# Patient Record
Sex: MALE | HISPANIC OR LATINO | Employment: FULL TIME | URBAN - METROPOLITAN AREA
[De-identification: names, ages, dates, MRNs, and addresses within clinical notes are randomized per-mention and may not be internally consistent; named-entity substitution may affect disease eponyms.]

---

## 2017-10-25 ENCOUNTER — OFFICE VISIT (OUTPATIENT)
Dept: PEDIATRICS | Facility: CLINIC | Age: 37
End: 2017-10-25
Payer: COMMERCIAL

## 2017-10-25 ENCOUNTER — TRANSFERRED RECORDS (OUTPATIENT)
Dept: HEALTH INFORMATION MANAGEMENT | Facility: CLINIC | Age: 37
End: 2017-10-25

## 2017-10-25 VITALS
BODY MASS INDEX: 27.13 KG/M2 | SYSTOLIC BLOOD PRESSURE: 170 MMHG | HEART RATE: 72 BPM | OXYGEN SATURATION: 99 % | TEMPERATURE: 98.9 F | WEIGHT: 183.2 LBS | DIASTOLIC BLOOD PRESSURE: 98 MMHG | HEIGHT: 69 IN

## 2017-10-25 DIAGNOSIS — Z23 NEED FOR PROPHYLACTIC VACCINATION AND INOCULATION AGAINST INFLUENZA: ICD-10-CM

## 2017-10-25 DIAGNOSIS — R93.1 ABNORMAL ECHOCARDIOGRAM: ICD-10-CM

## 2017-10-25 DIAGNOSIS — I10 HYPERTENSION GOAL BP (BLOOD PRESSURE) < 140/90: Primary | ICD-10-CM

## 2017-10-25 PROCEDURE — 90471 IMMUNIZATION ADMIN: CPT | Performed by: FAMILY MEDICINE

## 2017-10-25 PROCEDURE — 99203 OFFICE O/P NEW LOW 30 MIN: CPT | Mod: 25 | Performed by: FAMILY MEDICINE

## 2017-10-25 PROCEDURE — 90686 IIV4 VACC NO PRSV 0.5 ML IM: CPT | Performed by: FAMILY MEDICINE

## 2017-10-25 RX ORDER — AMLODIPINE BESYLATE 10 MG/1
10 TABLET ORAL DAILY
COMMUNITY
Start: 2016-03-14 | End: 2017-10-25

## 2017-10-25 RX ORDER — AMLODIPINE BESYLATE 10 MG/1
10 TABLET ORAL DAILY
Qty: 90 TABLET | Refills: 0 | Status: SHIPPED | OUTPATIENT
Start: 2017-10-25 | End: 2018-01-10

## 2017-10-25 RX ORDER — VALSARTAN 160 MG/1
160 TABLET ORAL DAILY
COMMUNITY
Start: 2016-03-14 | End: 2017-10-25

## 2017-10-25 RX ORDER — VALSARTAN 160 MG/1
160 TABLET ORAL DAILY
Qty: 90 TABLET | Refills: 0 | Status: SHIPPED | OUTPATIENT
Start: 2017-10-25 | End: 2018-01-10

## 2017-10-25 ASSESSMENT — PAIN SCALES - GENERAL: PAINLEVEL: NO PAIN (0)

## 2017-10-25 NOTE — PATIENT INSTRUCTIONS
Get the flu shot today  Start back on medications- take AMLODIPINE at night and VALSARTAN in the morning  Scheduled for fasting labs, physical, recheck on HTN in 4-5 weeks  - see appointment details below

## 2017-10-25 NOTE — PROGRESS NOTES
SUBJECTIVE:   Sin Murrieta is a 37 year old male who presents to clinic today for the following health issues:      New Patient/Transfer of Care  Hypertension Follow-up  This is a new patient to this provider, he is here to establish care. Past medical history is significant for chronic hypertension for 8-9 years which was diagnosed when patient was in [place] and avoid patient underwent herniorrhaphy  Patient underwent multiple testing to evaluate for secondary hypertension due to age of onset including an echocardiogram which showed mild septal hypertrophy  After patient moved to [place] in , He was started on amlodipine 10 mg and losartan once milligram daily which he has not been taking for the past 7 months since he ran out   Denies chest pain, SOB, edema legs, visual concerns, focal neurological symptoms, dizziness, syncope, palpitations.   patient denies concerns for snoring, sleep apnea.   He drinks 2-3 alcohol drinks over the weekends, patient does not smoke.  Family history of hypertension in mother at age 60  Patient works as a  at [employer]  She denies concerns for depression, anxiety  Patient denies personal or family history of thyroid disorder.      Outpatient blood pressures are being checked at random.      Low Salt Diet: low salt        Amount of exercise or physical activity: None    Problems taking medications regularly: Yes,  Out of medication since end of March 2017    Medication side effects: sexual side effects    Diet: low salt            Problem list and histories reviewed & adjusted, as indicated.  Additional history: as documented    Patient Active Problem List   Diagnosis     Hypertension goal BP (blood pressure) < 140/90     Past Surgical History:   Procedure Laterality Date     HERNIA REPAIR         Social History   Substance Use Topics     Smoking status: Former Smoker     Smokeless tobacco: Never Used     Alcohol use Yes      Comment: weekends     Family  "History   Problem Relation Age of Onset     CEREBROVASCULAR DISEASE Father      Hepatitis Brother          Current Outpatient Prescriptions   Medication Sig Dispense Refill     amLODIPine (NORVASC) 10 MG tablet Take 1 tablet (10 mg) by mouth daily 90 tablet 0     valsartan (DIOVAN) 160 MG tablet Take 1 tablet (160 mg) by mouth daily 90 tablet 0     [DISCONTINUED] amLODIPine (NORVASC) 10 MG tablet Take 10 mg by mouth daily       [DISCONTINUED] valsartan (DIOVAN) 160 MG tablet Take 160 mg by mouth daily       No Known Allergies  No lab results found.   BP Readings from Last 3 Encounters:   10/25/17 (!) 170/98    Wt Readings from Last 3 Encounters:   10/25/17 183 lb 3.2 oz (83.1 kg)                  Labs reviewed in EPIC          Reviewed and updated as needed this visit by clinical staffTobacco  Allergies  Meds  Med Hx  Surg Hx  Fam Hx  Soc Hx      Reviewed and updated as needed this visit by Provider         ROS:  C: NEGATIVE for fever, chills, change in weight  INTEGUMENTARY/SKIN: NEGATIVE for worrisome rashes, moles or lesions  EYES: NEGATIVE for vision changes or irritation  R: NEGATIVE for significant cough or SOB  CV: NEGATIVE for chest pain, palpitations or peripheral edema  CV: Hx HTN  GI: NEGATIVE for nausea, abdominal pain, heartburn, or change in bowel habits  MUSCULOSKELETAL: NEGATIVE for significant arthralgias or myalgia  NEURO: NEGATIVE for weakness, dizziness or paresthesias  ENDOCRINE: NEGATIVE for temperature intolerance, skin/hair changes  HEME/ALLERGY/IMMUNE: NEGATIVE for bleeding problems  PSYCHIATRIC: NEGATIVE for changes in mood or affect    OBJECTIVE:     BP (!) 170/98  Pulse 72  Temp 98.9  F (37.2  C) (Oral)  Ht 5' 8.5\" (1.74 m)  Wt 183 lb 3.2 oz (83.1 kg)  SpO2 99%  BMI 27.45 kg/m2  Body mass index is 27.45 kg/(m^2).  GENERAL: healthy, alert and no distress  NECK: no adenopathy, no asymmetry, masses, or scars and thyroid normal to palpation  RESP: lungs clear to auscultation - no " rales, rhonchi or wheezes  CV: regular rate and rhythm, normal S1 S2, no S3 or S4, no murmur, click or rub, no peripheral edema and peripheral pulses strong  MS: no gross musculoskeletal defects noted, no edema  NEURO: Normal strength and tone, mentation intact and speech normal  PSYCH: mentation appears normal, affect normal/bright    Diagnostic Test Results:  none     ASSESSMENT/PLAN:     Hypertension; slightly worsened, Stopped medication several months ago   Associated with the following complications:    None   Plan:  As mentioned below            1. Hypertension goal BP (blood pressure) < 140/90  BP Readings from Last 6 Encounters:   10/25/17 (!) 170/98     Patient has brought echo reports, EKG readings,Abdominal and pelvic contrasted CT and monitor results from 2013 That was done in [place] and which was in [place] citizen language, patient read the results to provider in English  All were reportedly normal except for mild septal hypertrophy from echocardiogram  Abdominal and pelvic CT showed benign liver cyst otherwise normal  Recommended patient to restart medications to control hypertension, consider echo at his next visit in 4 weeks after blood pressure is at goal for recheck on the abnormal finding  Will follow low salt diet, weight loss and regular exercises.  Follow-up in 4 weeks along with fasting labs in for physical  Patient verbalised understanding and is agreeable to the plan.    - amLODIPine (NORVASC) 10 MG tablet; Take 1 tablet (10 mg) by mouth daily  Dispense: 90 tablet; Refill: 0  - valsartan (DIOVAN) 160 MG tablet; Take 1 tablet (160 mg) by mouth daily  Dispense: 90 tablet; Refill: 0    2. Need for prophylactic vaccination and inoculation against influenza    - Vaccine Administration, Initial [64459]  - FLU VAC, SPLIT VIRUS IM > 3 YO (QUADRIVALENT) [48759]    3. Abnormal echocardiogram  as above        Chart documentation done in part with Dragon Voice recognition Software. Although reviewed after  completion, some word and grammatical error may remain.    See Patient Instructions    Jose Holden MD  [facility]  Injectable Influenza Immunization Documentation    1.  Is the person to be vaccinated sick today?   No    2. Does the person to be vaccinated have an allergy to a component   of the vaccine?   No  Egg Allergy Algorithm Link    3. Has the person to be vaccinated ever had a serious reaction   to influenza vaccine in the past?   No    4. Has the person to be vaccinated ever had Guillain-Barré syndrome?   No    Form completed by Nikita Hargrove [facility]

## 2017-10-25 NOTE — MR AVS SNAPSHOT
After Visit Summary   10/25/2017    Sin Murrieat    MRN: [id number]           Patient Information     Date Of Birth          1980        Visit Information        Provider Department      10/25/2017 6:30 PM Jose Holden MD [facility]        Today's Diagnoses     Hypertension goal BP (blood pressure) < 140/90    -  1    Need for prophylactic vaccination and inoculation against influenza        Abnormal echocardiogram          Care Instructions    Get the flu shot today  Start back on medications- take AMLODIPINE at night and VALSARTAN in the morning  Scheduled for fasting labs, physical, recheck on HTN in 4-5 weeks  - see appointment details below            Follow-ups after your visit        Your next 10 appointments already scheduled     Nov 29, 2017  7:20 AM CST   LAB with LAB FIRST FLOOR [facility] ([facility])    [address] [place] [id number] [phone]           Patient must bring picture ID. Patient should be prepared to give a urine specimen  Please do not eat 10-12 hours before your appointment if you are coming in fasting for labs on lipids, cholesterol, or glucose (sugar). Pregnant women should follow their Care Team instructions. Water with medications is okay. Do not drink coffee or other fluids. If you have concerns about taking  your medications, please ask at office or if scheduling via Cookapp, send a message by clicking on Secure Messaging, Message Your Care Team.            Nov 29, 2017  6:30 PM CST   PHYSICAL with Jose Holden MD   [facility] ([facility])    [address] [address] [place] [address] [phone]              Who to contact     If you have questions or need follow up information about today's clinic visit or your schedule please contact [facility] directly at [phone].  Normal or non-critical lab and  "imaging results will be communicated to you by MyChart, letter or phone within 4 business days after the clinic has received the results. If you do not hear from us within 7 days, please contact the clinic through [employer] or phone. If you have a critical or abnormal lab result, we will notify you by phone as soon as possible.  Submit refill requests through [employer] or call your pharmacy and they will forward the refill request to us. Please allow 3 business days for your refill to be completed.          Additional Information About Your Visit        [employer] Information     [employer] is an electronic gateway that provides easy, online access to your medical records. With [employer], you can request a clinic appointment, read your test results, renew a prescription or communicate with your care team.     To sign up for [employer] visit the website at [email or web address]   You will be asked to enter the access code listed below, as well as some personal information. Please follow the directions to create your username and password.     Your access code is: [email or web address]  Expires: 2018  6:55 PM     Your access code will  in 90 days. If you need help or a new code, please contact your [facility] or call [phone] for assistance.        Care EveryWhere ID     This is your Care EveryWhere ID. This could be used by other organizations to access your [facility] medical records  [id number]        Your Vitals Were     Pulse Temperature Height Pulse Oximetry BMI (Body Mass Index)       72 98.9  F (37.2  C) (Oral) 5' 8.5\" (1.74 m) 99% 27.45 kg/m2        Blood Pressure from Last 3 Encounters:   10/25/17 (!) 170/98    Weight from Last 3 Encounters:   10/25/17 183 lb 3.2 oz (83.1 kg)              We Performed the Following     FLU VAC, SPLIT VIRUS IM > 3 YO (QUADRIVALENT) [33203]     Vaccine Administration, Initial [77646]          Where to get your medicines      These medications were " sent to [id number] - [place], [place] - [address]  [address] [facility] [address]     Phone:  [phone]     amLODIPine 10 MG tablet    valsartan 160 MG tablet          Primary Care Provider Office Phone # Fax #    [facility] [phone]       [address]  [facility] [address]        Equal Access to Services     SEA DEWITT : Hadii aad ku hadasho Soomaali, waaxda luqadaha, qaybta kaalmada adeegyada, waxay idiin hayaan adeeg kharash la'aaanahi . So [facility] [phone].    ATENCIÓN: Si habla español, tiene a thornton disposición servicios gratuitos de asistencia lingüística. [facility] [phone].    We comply with applicable federal civil rights laws and [place] laws. We do not discriminate on the basis of race, color, national origin, age, disability, sex, sexual orientation, or gender identity.            Thank you!     Thank you for choosing [facility]  for your care. Our goal is always to provide you with excellent care. Hearing back from our patients is one way we can continue to improve our services. Please take a few minutes to complete the written survey that you may receive in the mail after your visit with us. Thank you!             Your Updated Medication List - Protect others around you: Learn how to safely use, store and throw away your medicines at www.disposemymeds.org.          This list is accurate as of: 10/25/17  6:55 PM.  Always use your most recent med list.                   Brand Name Dispense Instructions for use Diagnosis    amLODIPine 10 MG tablet    NORVASC    90 tablet    Take 1 tablet (10 mg) by mouth daily    Hypertension goal BP (blood pressure) < 140/90       valsartan 160 MG tablet    DIOVAN    90 tablet    Take 1 tablet (160 mg) by mouth daily    Hypertension goal BP (blood pressure) < 140/90

## 2017-10-25 NOTE — NURSING NOTE
"Chief Complaint   Patient presents with     Establish Care     Hypertension       Initial BP (!) 170/98  Pulse 72  Temp 98.9  F (37.2  C) (Oral)  Ht 5' 8.5\" (1.74 m)  Wt 183 lb 3.2 oz (83.1 kg)  SpO2 99%  BMI 27.45 kg/m2 Estimated body mass index is 27.45 kg/(m^2) as calculated from the following:    Height as of this encounter: 5' 8.5\" (1.74 m).    Weight as of this encounter: 183 lb 3.2 oz (83.1 kg).  BP completed using cuff size: yury Hargrove CMA    "

## 2017-11-27 PROBLEM — Z13.6 CARDIOVASCULAR SCREENING; LDL GOAL LESS THAN 160: Status: ACTIVE | Noted: 2017-11-27

## 2018-01-10 ENCOUNTER — OFFICE VISIT (OUTPATIENT)
Dept: PEDIATRICS | Facility: CLINIC | Age: 38
End: 2018-01-10
Payer: COMMERCIAL

## 2018-01-10 VITALS
BODY MASS INDEX: 28.3 KG/M2 | SYSTOLIC BLOOD PRESSURE: 134 MMHG | OXYGEN SATURATION: 97 % | DIASTOLIC BLOOD PRESSURE: 85 MMHG | HEART RATE: 74 BPM | WEIGHT: 191.1 LBS | HEIGHT: 69 IN | TEMPERATURE: 98.2 F

## 2018-01-10 DIAGNOSIS — B07.8 COMMON WART: ICD-10-CM

## 2018-01-10 DIAGNOSIS — Z13.6 CARDIOVASCULAR SCREENING; LDL GOAL LESS THAN 160: ICD-10-CM

## 2018-01-10 DIAGNOSIS — Z00.01 ENCOUNTER FOR GENERAL ADULT MEDICAL EXAMINATION WITH ABNORMAL FINDINGS: Primary | ICD-10-CM

## 2018-01-10 DIAGNOSIS — I10 HYPERTENSION GOAL BP (BLOOD PRESSURE) < 140/90: ICD-10-CM

## 2018-01-10 DIAGNOSIS — Z13.29 SCREENING FOR THYROID DISORDER: ICD-10-CM

## 2018-01-10 DIAGNOSIS — Z13.1 SCREENING FOR DIABETES MELLITUS (DM): ICD-10-CM

## 2018-01-10 DIAGNOSIS — E66.3 OVERWEIGHT (BMI 25.0-29.9): ICD-10-CM

## 2018-01-10 DIAGNOSIS — Z00.00 ROUTINE GENERAL MEDICAL EXAMINATION AT A HEALTH CARE FACILITY: ICD-10-CM

## 2018-01-10 LAB
ALBUMIN SERPL-MCNC: 3.4 G/DL (ref 3.4–5)
ALP SERPL-CCNC: 99 U/L (ref 40–150)
ALT SERPL W P-5'-P-CCNC: 46 U/L (ref 0–70)
ANION GAP SERPL CALCULATED.3IONS-SCNC: 8 MMOL/L (ref 3–14)
AST SERPL W P-5'-P-CCNC: 17 U/L (ref 0–45)
BASOPHILS # BLD AUTO: 0.1 10E9/L (ref 0–0.2)
BASOPHILS NFR BLD AUTO: 0.8 %
BILIRUB SERPL-MCNC: 0.3 MG/DL (ref 0.2–1.3)
BUN SERPL-MCNC: 22 MG/DL (ref 7–30)
CALCIUM SERPL-MCNC: 7.9 MG/DL (ref 8.5–10.1)
CHLORIDE SERPL-SCNC: 106 MMOL/L (ref 94–109)
CHOLEST SERPL-MCNC: 157 MG/DL
CO2 SERPL-SCNC: 26 MMOL/L (ref 20–32)
CREAT SERPL-MCNC: 0.97 MG/DL (ref 0.66–1.25)
CREAT UR-MCNC: 111 MG/DL
DIFFERENTIAL METHOD BLD: NORMAL
EOSINOPHIL # BLD AUTO: 0.2 10E9/L (ref 0–0.7)
EOSINOPHIL NFR BLD AUTO: 2.6 %
ERYTHROCYTE [DISTWIDTH] IN BLOOD BY AUTOMATED COUNT: 13.1 % (ref 10–15)
GFR SERPL CREATININE-BSD FRML MDRD: 87 ML/MIN/1.7M2
GLUCOSE SERPL-MCNC: 99 MG/DL (ref 70–99)
HCT VFR BLD AUTO: 43.8 % (ref 40–53)
HDLC SERPL-MCNC: 41 MG/DL
HGB BLD-MCNC: 14.9 G/DL (ref 13.3–17.7)
IMM GRANULOCYTES # BLD: 0.1 10E9/L (ref 0–0.4)
IMM GRANULOCYTES NFR BLD: 1.1 %
LDLC SERPL CALC-MCNC: 69 MG/DL
LYMPHOCYTES # BLD AUTO: 3.1 10E9/L (ref 0.8–5.3)
LYMPHOCYTES NFR BLD AUTO: 34 %
MCH RBC QN AUTO: 30.1 PG (ref 26.5–33)
MCHC RBC AUTO-ENTMCNC: 34 G/DL (ref 31.5–36.5)
MCV RBC AUTO: 89 FL (ref 78–100)
MICROALBUMIN UR-MCNC: <5 MG/L
MICROALBUMIN/CREAT UR: NORMAL MG/G CR (ref 0–17)
MONOCYTES # BLD AUTO: 0.9 10E9/L (ref 0–1.3)
MONOCYTES NFR BLD AUTO: 9.4 %
NEUTROPHILS # BLD AUTO: 4.8 10E9/L (ref 1.6–8.3)
NEUTROPHILS NFR BLD AUTO: 52.1 %
NONHDLC SERPL-MCNC: 116 MG/DL
PLATELET # BLD AUTO: 288 10E9/L (ref 150–450)
POTASSIUM SERPL-SCNC: 3.9 MMOL/L (ref 3.4–5.3)
PROT SERPL-MCNC: 6.8 G/DL (ref 6.8–8.8)
RBC # BLD AUTO: 4.95 10E12/L (ref 4.4–5.9)
SODIUM SERPL-SCNC: 140 MMOL/L (ref 133–144)
TRIGL SERPL-MCNC: 235 MG/DL
TSH SERPL DL<=0.005 MIU/L-ACNC: 1.88 MU/L (ref 0.4–4)
WBC # BLD AUTO: 9.2 10E9/L (ref 4–11)

## 2018-01-10 PROCEDURE — 99395 PREV VISIT EST AGE 18-39: CPT | Performed by: FAMILY MEDICINE

## 2018-01-10 PROCEDURE — 36415 COLL VENOUS BLD VENIPUNCTURE: CPT | Performed by: FAMILY MEDICINE

## 2018-01-10 PROCEDURE — 80050 GENERAL HEALTH PANEL: CPT | Performed by: FAMILY MEDICINE

## 2018-01-10 PROCEDURE — 80061 LIPID PANEL: CPT | Performed by: FAMILY MEDICINE

## 2018-01-10 PROCEDURE — 82043 UR ALBUMIN QUANTITATIVE: CPT | Performed by: FAMILY MEDICINE

## 2018-01-10 RX ORDER — AMLODIPINE BESYLATE 10 MG/1
10 TABLET ORAL DAILY
Qty: 90 TABLET | Refills: 2 | Status: SHIPPED | OUTPATIENT
Start: 2018-01-10 | End: 2018-11-11

## 2018-01-10 RX ORDER — VALSARTAN 160 MG/1
160 TABLET ORAL DAILY
Qty: 90 TABLET | Refills: 2 | Status: SHIPPED | OUTPATIENT
Start: 2018-01-10 | End: 2018-07-16

## 2018-01-10 ASSESSMENT — PAIN SCALES - GENERAL: PAINLEVEL: NO PAIN (0)

## 2018-01-10 NOTE — MR AVS SNAPSHOT
After Visit Summary   1/10/2018    Sin Murrieta Jr.    MRN: [id number]           Patient Information     Date Of Birth          1980        Visit Information        Provider Department      1/10/2018 6:30 PM Jose Holden MD [facility]        Today's Diagnoses     Encounter for general adult medical examination with abnormal findings    -  1    Hypertension goal BP (blood pressure) < 140/90        Common wart          Care Instructions    Take LOSARTAN in the morning and amlodipine at night    Schedule for dermatology consult  Schedule for recheck in 6 months      Preventive Health Recommendations  Male Ages 26 - 39    Yearly exam:             See your health care provider every year in order to  o   Review health changes.   o   Discuss preventive care.    o   Review your medicines if your doctor has prescribed any.    You should be tested each year for STDs (sexually transmitted diseases), if you re at risk.     After age 35, talk to your provider about cholesterol testing. If you are at risk for heart disease, have your cholesterol tested at least every 5 years.     If you are at risk for diabetes, you should have a diabetes test (fasting glucose).  Shots: Get a flu shot each year. Get a tetanus shot every 10 years.     Nutrition:    Eat at least 5 servings of fruits and vegetables daily.     Eat whole-grain bread, whole-wheat pasta and brown rice instead of white grains and rice.     Talk to your provider about Calcium and Vitamin D.     Lifestyle    Exercise for at least 150 minutes a week (30 minutes a day, 5 days a week). This will help you control your weight and prevent disease.     Limit alcohol to one drink per day.     No smoking.     Wear sunscreen to prevent skin cancer.     See your dentist every six months for an exam and cleaning.             Follow-ups after your visit        Additional Services     DERMATOLOGY REFERRAL       Your provider has referred  you to: [facility]: [facility] [phone]   [email or web address]    Please be aware that coverage of these services is subject to the terms and limitations of your health insurance plan.  Call member services at your health plan with any benefit or coverage questions.      Please bring the following with you to your appointment:    (1) Any X-Rays, CTs or MRIs which have been performed.  Contact the facility where they were done to arrange for  prior to your scheduled appointment.    (2) List of current medications  (3) This referral request   (4) Any documents/labs given to you for this referral                  Follow-up notes from your care team     Return in about 6 months (around 7/10/2018) for Medication check.      Your next 10 appointments already scheduled     Chaitanya 10, 2018  6:30 PM CST   PHYSICAL with Jose Holden MD   [facility] ([facility]    [address] [place] [address] [phone]              Who to contact     If you have questions or need follow up information about today's clinic visit or your schedule please contact [facility] directly at [phone].  Normal or non-critical lab and imaging results will be communicated to you by MyChart, letter or phone within 4 business days after the clinic has received the results. If you do not hear from us within 7 days, please contact the clinic through MyChart or phone. If you have a critical or abnormal lab result, we will notify you by phone as soon as possible.  Submit refill requests through [employer] or call your pharmacy and they will forward the refill request to us. Please allow 3 business days for your refill to be completed.          Additional Information About Your Visit        [employer] Information     [employer] is an electronic gateway that provides easy, online access to your medical  "records. With [employer], you can request a clinic appointment, read your test results, renew a prescription or communicate with your care team.     To sign up for [employer] visit the website at [email or web address]   You will be asked to enter the access code listed below, as well as some personal information. Please follow the directions to create your username and password.     Your access code is: [email or web address]  Expires: 2018  5:55 PM     Your access code will  in 90 days. If you need help or a new code, please contact your [facility] or call [phone] for assistance.        Care EveryWhere ID     This is your Care EveryWhere ID. This could be used by other organizations to access your [facility] medical records  [id number]        Your Vitals Were     Pulse Temperature Height Pulse Oximetry BMI (Body Mass Index)       74 98.2  F (36.8  C) (Oral) 5' 8.5\" (1.74 m) 97% 28.63 kg/m2        Blood Pressure from Last 3 Encounters:   01/10/18 134/85   10/25/17 (!) 170/98    Weight from Last 3 Encounters:   01/10/18 191 lb 1.6 oz (86.7 kg)   10/25/17 183 lb 3.2 oz (83.1 kg)              We Performed the Following     DERMATOLOGY REFERRAL          Where to get your medicines      These medications were sent to [id number] [address] [address]     Phone:  [phone]     amLODIPine 10 MG tablet    valsartan 160 MG tablet          Primary Care Provider Office Phone # Fax #    [facility] [phone] [address]  [facility] [address]        Equal Access to Services     SEA DEWITT : [place] flowerso Sodana, waaxda luqadaha, qaybta kaalmada tatayaolivia, aniket barrett. [facility] [phone].    ATENCIÓN: Si habla español, tiene a thornton disposición servicios gratuitos de asistencia lingüística. Llame al [phone].    We " comply with applicable federal civil rights laws and [place] laws. We do not discriminate on the basis of race, color, national origin, age, disability, sex, sexual orientation, or gender identity.            Thank you!     Thank you for choosing [facility]  for your care. Our goal is always to provide you with excellent care. Hearing back from our patients is one way we can continue to improve our services. Please take a few minutes to complete the written survey that you may receive in the mail after your visit with us. Thank you!             Your Updated Medication List - Protect others around you: Learn how to safely use, store and throw away your medicines at www.disposemymeds.org.          This list is accurate as of: 1/10/18  6:17 PM.  Always use your most recent med list.                   Brand Name Dispense Instructions for use Diagnosis    amLODIPine 10 MG tablet    NORVASC    90 tablet    Take 1 tablet (10 mg) by mouth daily    Hypertension goal BP (blood pressure) < 140/90       valsartan 160 MG tablet    DIOVAN    90 tablet    Take 1 tablet (160 mg) by mouth daily    Hypertension goal BP (blood pressure) < 140/90

## 2018-01-10 NOTE — NURSING NOTE
"Chief Complaint   Patient presents with     Physical       Initial BP (!) 132/96 (BP Location: Right arm, Patient Position: Sitting, Cuff Size: Adult Large)  Pulse 74  Temp 98.2  F (36.8  C) (Oral)  Ht 5' 8.5\" (1.74 m)  Wt 191 lb 1.6 oz (86.7 kg)  SpO2 97%  BMI 28.63 kg/m2 Estimated body mass index is 28.63 kg/(m^2) as calculated from the following:    Height as of this encounter: 5' 8.5\" (1.74 m).    Weight as of this encounter: 191 lb 1.6 oz (86.7 kg).  BP completed using cuff size: alvarado Hargrove, CMA    "

## 2018-01-10 NOTE — PATIENT INSTRUCTIONS
Take diovan in the morning and amlodipine at night    Schedule for dermatology consult  Schedule for recheck in 6 months      Preventive Health Recommendations  Male Ages 26 - 39    Yearly exam:             See your health care provider every year in order to  o   Review health changes.   o   Discuss preventive care.    o   Review your medicines if your doctor has prescribed any.    You should be tested each year for STDs (sexually transmitted diseases), if you re at risk.     After age 35, talk to your provider about cholesterol testing. If you are at risk for heart disease, have your cholesterol tested at least every 5 years.     If you are at risk for diabetes, you should have a diabetes test (fasting glucose).  Shots: Get a flu shot each year. Get a tetanus shot every 10 years.     Nutrition:    Eat at least 5 servings of fruits and vegetables daily.     Eat whole-grain bread, whole-wheat pasta and brown rice instead of white grains and rice.     Talk to your provider about Calcium and Vitamin D.     Lifestyle    Exercise for at least 150 minutes a week (30 minutes a day, 5 days a week). This will help you control your weight and prevent disease.     Limit alcohol to one drink per day.     No smoking.     Wear sunscreen to prevent skin cancer.     See your dentist every six months for an exam and cleaning.

## 2018-06-18 ENCOUNTER — TELEPHONE (OUTPATIENT)
Dept: PEDIATRICS | Facility: CLINIC | Age: 38
End: 2018-06-18

## 2018-06-18 NOTE — TELEPHONE ENCOUNTER
1st attempt    Left message for patient to return clinic call regarding scheduling. Patient needs a Return  appointment for 6 month medication check with Dr Holden on or after 7/10/18. Number to clinic and Mychart option given, please assist in scheduling once patient returns clinic call .    Call Center OKAY TO SCHEDULE.    Thanks,   Lizzie Goncalves  Primary Care   [facility] [place]

## 2018-07-16 ENCOUNTER — TELEPHONE (OUTPATIENT)
Dept: PEDIATRICS | Facility: CLINIC | Age: 38
End: 2018-07-16

## 2018-07-16 DIAGNOSIS — I10 HYPERTENSION GOAL BP (BLOOD PRESSURE) < 140/90: Primary | ICD-10-CM

## 2018-07-16 RX ORDER — LOSARTAN POTASSIUM 25 MG/1
25 TABLET ORAL DAILY
Qty: 30 TABLET | Refills: 1 | Status: SHIPPED | OUTPATIENT
Start: 2018-07-16 | End: 2018-09-14

## 2018-07-30 ENCOUNTER — TRANSFERRED RECORDS (OUTPATIENT)
Dept: HEALTH INFORMATION MANAGEMENT | Facility: CLINIC | Age: 38
End: 2018-07-30

## 2018-07-30 LAB
ALT SERPL-CCNC: 34 IU/L (ref 0–44)
AST SERPL-CCNC: 21 IU/L (ref 0–40)
CHOLEST SERPL-MCNC: 189 MG/DL (ref 100–199)
CREAT SERPL-MCNC: 0.78 MG/DL (ref 0.76–1.27)
GFR SERPL CREATININE-BSD FRML MDRD: 116 ML/MIN/1.73M2 (ref 60–155)
GLUCOSE SERPL-MCNC: 88 MG/DL (ref 65–99)
HDLC SERPL-MCNC: 49 MG/DL (ref 40–175)
LDLC SERPL CALC-MCNC: 111 MG/DL (ref 0–99)
NONHDLC SERPL-MCNC: 140 MG/DL (ref 0–129)
POTASSIUM SERPL-SCNC: 4.3 MMOL/L (ref 3.5–5.2)
TRIGL SERPL-MCNC: 144 MG/DL (ref 0–149)

## 2018-09-14 DIAGNOSIS — I10 HYPERTENSION GOAL BP (BLOOD PRESSURE) < 140/90: ICD-10-CM

## 2018-09-17 RX ORDER — LOSARTAN POTASSIUM 25 MG/1
25 TABLET ORAL DAILY
Qty: 15 TABLET | Refills: 0 | Status: SHIPPED | OUTPATIENT
Start: 2018-09-17 | End: 2018-11-28 | Stop reason: DRUGHIGH

## 2018-09-17 NOTE — TELEPHONE ENCOUNTER
Routing refill request to provider for review/approval because:  Patient needs to be seen because:  Per Dr. Holden patient to return in 7/10/18  3 phone call attempts, a letter, 3 brianne refills have been made      losartan (COZAAR) 25 MG tablet 30 tablet 1 7/16/2018  --   Sig - Route: Take 1 tablet (25 mg) by mouth daily - Oral       Last OV with Dr. Holden: 1/10/2018 Return in about 6 months (around 7/10/2018) for Medication check.       No future apts.     BP Readings from Last 3 Encounters:   01/10/18 134/85   10/25/17 (!) 170/98     Creatinine   Date Value Ref Range Status   01/10/2018 0.97 0.66 - 1.25 mg/dL Final     Potassium   Date Value Ref Range Status   01/10/2018 3.9 3.4 - 5.3 mmol/L Final     Angiotensin-II Receptors Passed9/14 4:06 AM   Blood pressure under 140/90 in past 12 months    Recent (12 mo) or future (30 days) visit within the authorizing provider's specialty    Patient is age 18 or older    Normal serum creatinine on file in past 12 months    Normal serum potassium on file in past 12 months     Mili Del Toro RN

## 2018-09-17 NOTE — TELEPHONE ENCOUNTER
Called patient on home number to inform. Patient states understanding and agrees to plan.    Scheduled Future Office visit:    Next 5 appointments (look out 90 days)     Oct 03, 2018  6:10 PM CDT   Return Visit with Jose Holden MD   [facility]    [address] [place] [id number]   [phone]                 Nikita Hargrove CMA

## 2018-11-11 DIAGNOSIS — I10 HYPERTENSION GOAL BP (BLOOD PRESSURE) < 140/90: ICD-10-CM

## 2018-11-13 RX ORDER — AMLODIPINE BESYLATE 10 MG/1
TABLET ORAL
Qty: 90 TABLET | Refills: 0 | Status: SHIPPED | OUTPATIENT
Start: 2018-11-13 | End: 2018-11-28

## 2018-11-13 NOTE — TELEPHONE ENCOUNTER
amLODIPine (NORVASC) 10 MG tablet 90 tablet 2 1/10/2018  No   Sig - Route: Take 1 tablet (10 mg) by mouth daily - Oral     Last OV with Dr. Holden: 1/10/2018    Next 5 appointments (look out 90 days)     Nov 28, 2018  5:10 PM CST   Return Visit with Jose Holden MD   [facility]    [address] [place] [id number] [phone]                BP Readings from Last 3 Encounters:   01/10/18 134/85   10/25/17 (!) 170/98     Creatinine   Date Value Ref Range Status   01/10/2018 0.97 0.66 - 1.25 mg/dL Final     Calcium Channel Blockers Protocol Aakyni98/11 4:05 AM   Blood pressure under 140/90 in past 12 months    Recent (12 mo) or future (30 days) visit within the authorizing provider's specialty    Patient is age 18 or older    Normal serum creatinine on file in past 12 months     Refilled per P protocol.    Mili Del Toro RN

## 2018-11-28 ENCOUNTER — OFFICE VISIT (OUTPATIENT)
Dept: PEDIATRICS | Facility: CLINIC | Age: 38
End: 2018-11-28
Payer: COMMERCIAL

## 2018-11-28 VITALS
BODY MASS INDEX: 29.52 KG/M2 | DIASTOLIC BLOOD PRESSURE: 97 MMHG | HEART RATE: 88 BPM | OXYGEN SATURATION: 97 % | TEMPERATURE: 98.3 F | WEIGHT: 197 LBS | SYSTOLIC BLOOD PRESSURE: 143 MMHG

## 2018-11-28 DIAGNOSIS — I10 HYPERTENSION GOAL BP (BLOOD PRESSURE) < 140/90: Primary | ICD-10-CM

## 2018-11-28 DIAGNOSIS — D72.829 LEUKOCYTOSIS, UNSPECIFIED TYPE: ICD-10-CM

## 2018-11-28 DIAGNOSIS — R53.83 OTHER FATIGUE: ICD-10-CM

## 2018-11-28 DIAGNOSIS — R06.83 SNORING: ICD-10-CM

## 2018-11-28 LAB
BASOPHILS # BLD AUTO: 0.1 10E9/L (ref 0–0.2)
BASOPHILS NFR BLD AUTO: 0.6 %
CREAT UR-MCNC: 103 MG/DL
DIFFERENTIAL METHOD BLD: ABNORMAL
EOSINOPHIL # BLD AUTO: 0.2 10E9/L (ref 0–0.7)
EOSINOPHIL NFR BLD AUTO: 1.8 %
ERYTHROCYTE [DISTWIDTH] IN BLOOD BY AUTOMATED COUNT: 12.6 % (ref 10–15)
HCT VFR BLD AUTO: 46 % (ref 40–53)
HGB BLD-MCNC: 15.6 G/DL (ref 13.3–17.7)
IMM GRANULOCYTES # BLD: 0.1 10E9/L (ref 0–0.4)
IMM GRANULOCYTES NFR BLD: 0.9 %
LYMPHOCYTES # BLD AUTO: 3.1 10E9/L (ref 0.8–5.3)
LYMPHOCYTES NFR BLD AUTO: 25.9 %
MCH RBC QN AUTO: 29.5 PG (ref 26.5–33)
MCHC RBC AUTO-ENTMCNC: 33.9 G/DL (ref 31.5–36.5)
MCV RBC AUTO: 87 FL (ref 78–100)
MICROALBUMIN UR-MCNC: 9 MG/L
MICROALBUMIN/CREAT UR: 8.61 MG/G CR (ref 0–17)
MONOCYTES # BLD AUTO: 1 10E9/L (ref 0–1.3)
MONOCYTES NFR BLD AUTO: 8.5 %
NEUTROPHILS # BLD AUTO: 7.4 10E9/L (ref 1.6–8.3)
NEUTROPHILS NFR BLD AUTO: 62.3 %
PLATELET # BLD AUTO: 352 10E9/L (ref 150–450)
RBC # BLD AUTO: 5.28 10E12/L (ref 4.4–5.9)
WBC # BLD AUTO: 11.9 10E9/L (ref 4–11)

## 2018-11-28 PROCEDURE — 36415 COLL VENOUS BLD VENIPUNCTURE: CPT | Performed by: FAMILY MEDICINE

## 2018-11-28 PROCEDURE — 82043 UR ALBUMIN QUANTITATIVE: CPT | Performed by: FAMILY MEDICINE

## 2018-11-28 PROCEDURE — 85025 COMPLETE CBC W/AUTO DIFF WBC: CPT | Performed by: FAMILY MEDICINE

## 2018-11-28 PROCEDURE — 99214 OFFICE O/P EST MOD 30 MIN: CPT | Performed by: FAMILY MEDICINE

## 2018-11-28 RX ORDER — LOSARTAN POTASSIUM AND HYDROCHLOROTHIAZIDE 12.5; 5 MG/1; MG/1
1 TABLET ORAL DAILY
Qty: 30 TABLET | Refills: 1 | Status: SHIPPED | OUTPATIENT
Start: 2018-11-28 | End: 2019-01-11

## 2018-11-28 RX ORDER — AMLODIPINE BESYLATE 10 MG/1
10 TABLET ORAL DAILY
Qty: 90 TABLET | Refills: 1 | Status: SHIPPED | OUTPATIENT
Start: 2018-11-28 | End: 2019-10-27

## 2018-11-28 ASSESSMENT — PAIN SCALES - GENERAL: PAINLEVEL: NO PAIN (0)

## 2018-11-28 NOTE — MR AVS SNAPSHOT
After Visit Summary   11/28/2018    Sin Murrieta Jr.    MRN: [id number]           Patient Information     Date Of Birth          1980        Visit Information        Provider Department      11/28/2018 5:10 PM Jose Holden MD [facility]        Today's Diagnoses     Hypertension goal BP (blood pressure) < 140/90    -  1    Leukocytosis, unspecified type        Snoring        Other fatigue          Care Instructions    Stop LOSARTAN and start on HYZAAR in the morning  Continue AMLODIPINE at bedtime  Get the labs today  Schedule for BP recheck with staff and non fasting labs in 2weeks  Schedule for sleep study          Follow-ups after your visit        Additional Services     SLEEP EVALUATION & MANAGEMENT REFERRAL - [facility]  [phone] (Age 15 and up)       Please be aware that coverage of these services is subject to the terms and limitations of your health insurance plan.  Call member services at your health plan with any benefit or coverage questions.      Please bring the following to your appointment:    >>   List of current medications   >>   This referral request   >>   Any documents/labs given to you for this referral                      Future tests that were ordered for you today     Open Future Orders        Priority Expected Expires Ordered    SLEEP EVALUATION & MANAGEMENT REFERRAL - [facility]  [phone] (Age 15 and up) Routine  11/28/2019 11/28/2018            Who to contact     If you have questions or need follow up information about today's clinic visit or your schedule please contact [facility] directly at [phone].  Normal or non-critical lab and imaging results will be communicated to you by MyChart, letter or phone within 4 business days after the clinic has received the results. If you do not hear from us within 7 days, please contact the clinic  through [employer] or phone. If you have a critical or abnormal lab result, we will notify you by phone as soon as possible.  Submit refill requests through [employer] or call your pharmacy and they will forward the refill request to us. Please allow 3 business days for your refill to be completed.          Additional Information About Your Visit        [employer] Information     [employer] gives you secure access to your electronic health record. If you see a primary care provider, you can also send messages to your care team and make appointments. If you have questions, please call your primary care clinic.  If you do not have a primary care provider, please call [phone] and they will assist you.      [employer] is an electronic gateway that provides easy, online access to your medical records. With [employer], you can request a clinic appointment, read your test results, renew a prescription or communicate with your care team.     To access your existing account, please contact your [facility] or call [phone] for assistance.        Care EveryWhere ID     This is your Care EveryWhere ID. This could be used by other organizations to access your [facility] medical records  [id number]        Your Vitals Were     Pulse Temperature Pulse Oximetry BMI (Body Mass Index)          88 98.3  F (36.8  C) (Tympanic) 97% 29.52 kg/m2         Blood Pressure from Last 3 Encounters:   11/28/18 (!) 143/97   01/10/18 134/85   10/25/17 (!) 170/98    Weight from Last 3 Encounters:   11/28/18 197 lb (89.4 kg)   01/10/18 191 lb 1.6 oz (86.7 kg)   10/25/17 183 lb 3.2 oz (83.1 kg)              We Performed the Following     Albumin Random Urine Quantitative with Creat Ratio     CBC with platelets and differential          Today's Medication Changes          These changes are accurate as of 11/28/18  5:54 PM.  If you have any questions, ask your nurse or doctor.               Start taking these medicines.         Dose/Directions    losartan-hydrochlorothiazide 50-12.5 MG per tablet   Commonly known as:  HYZAAR   Used for:  Hypertension goal BP (blood pressure) < 140/90   Started by:  Jose Holden MD        Dose:  1 tablet   Take 1 tablet by mouth daily   Quantity:  30 tablet   Refills:  1         These medicines have changed or have updated prescriptions.        Dose/Directions    amLODIPine 10 MG tablet   Commonly known as:  NORVASC   This may have changed:  See the new instructions.   Used for:  Hypertension goal BP (blood pressure) < 140/90   Changed by:  Jose Holden MD        Dose:  10 mg   Take 1 tablet (10 mg) by mouth daily   Quantity:  90 tablet   Refills:  1         Stop taking these medicines if you haven't already. Please contact your care team if you have questions.     losartan 25 MG tablet   Commonly known as:  COZAAR   Stopped by:  Jose Holden MD                Where to get your medicines      These medications were sent to [facility] [address] [place] [address]     Phone:  [phone]     amLODIPine 10 MG tablet    losartan-hydrochlorothiazide 50-12.5 MG per tablet                Primary Care Provider Office Phone # Fax #    Jose Holden [phone]       [address] [facility] [address]        Equal Access to Services     [facility]: Hadii aad ku hadasho Soomaali, waaxda luqadaha, qaybta kaalmada adeegyada, waxcarole bridges hayangle barrett. So [facility] [phone].    ATENCIÓN: Si habla español, tiene a thornton disposición servicios gratuitos de asistencia lingüística. Nadir al [phone].    We comply with applicable federal civil rights laws and [place] laws. We do not discriminate on the basis of race, color, national origin, age, disability, sex, sexual orientation, or gender identity.            Thank you!     Thank you for choosing [facility]  for your  care. Our goal is always to provide you with excellent care. Hearing back from our patients is one way we can continue to improve our services. Please take a few minutes to complete the written survey that you may receive in the mail after your visit with us. Thank you!             Your Updated Medication List - Protect others around you: Learn how to safely use, store and throw away your medicines at www.disposemymeds.org.          This list is accurate as of 11/28/18  5:54 PM.  Always use your most recent med list.                   Brand Name Dispense Instructions for use Diagnosis    amLODIPine 10 MG tablet    NORVASC    90 tablet    Take 1 tablet (10 mg) by mouth daily    Hypertension goal BP (blood pressure) < 140/90       losartan-hydrochlorothiazide 50-12.5 MG per tablet    HYZAAR    30 tablet    Take 1 tablet by mouth daily    Hypertension goal BP (blood pressure) < 140/90

## 2018-11-28 NOTE — PATIENT INSTRUCTIONS
Stop LOSARTAN and start on HYZAAR in the morning  Continue AMLODIPINE at bedtime  Get the labs today  Schedule for BP recheck with staff and non fasting labs in 2weeks  Schedule for sleep study

## 2018-11-28 NOTE — PROGRESS NOTES
SUBJECTIVE:   Sin Murrieta Jr. is a 38 year old male who presents to clinic today for the following health issues:    Hypertension Follow-up    Patient is here for blood pressure recheck after switching medication from diovan to losartan.  Home blood pressure numbers are not at goal  He also brought a copy of the lab results that was done on July 30 at work  He noted his white blood cell counts being in the high normal range for the last few years  Patient is also complaining of loud snoring is noticed by his wife for the past 2 years and feeling tired and fatigued and not feeling refreshed in the morning when he wakes up  Patient denies recent concerns for weight gain, anemia, abnormal bleeding, muscle or joint pains  He tries to eat healthy  Denies concerns for insomnia, drinks 4-5 beers per week.   Denies chest pain, SOB, edema legs, visual concerns, focal neurological symptoms, dizziness, syncope, palpitations.        Outpatient blood pressures are being checked at home.  Results are 140's/90's    Low Salt Diet: no added salt      Amount of exercise or physical activity: None    Problems taking medications regularly: No    Medication side effects: none    Diet: regular (no restrictions)      Problem list and histories reviewed & adjusted, as indicated.  Additional history: as documented    Patient Active Problem List   Diagnosis     Hypertension goal BP (blood pressure) < 140/90     CARDIOVASCULAR SCREENING; LDL GOAL LESS THAN 160     Overweight (BMI 25.0-29.9)     Common wart, hand     Past Surgical History:   Procedure Laterality Date     HERNIA REPAIR         Social History   Substance Use Topics     Smoking status: Former Smoker     Smokeless tobacco: Never Used     Alcohol use Yes      Comment: weekends     Family History   Problem Relation Age of Onset     Cerebrovascular Disease Father      Hepatitis Brother          Current Outpatient Prescriptions   Medication Sig Dispense Refill     amLODIPine  (NORVASC) 10 MG tablet TAKE ONE TABLET BY MOUTH EVERY DAY 90 tablet 0     losartan-hydrochlorothiazide (HYZAAR) 50-12.5 MG per tablet Take 1 tablet by mouth daily 30 tablet 1     No Known Allergies  Recent Labs   Lab Test  01/10/18   0707   LDL  69   HDL  41   TRIG  235*   ALT  46   CR  0.97   GFRESTIMATED  87   GFRESTBLACK  >90   POTASSIUM  3.9   TSH  1.88      BP Readings from Last 3 Encounters:   11/28/18 (!) 143/97   01/10/18 134/85   10/25/17 (!) 170/98    Wt Readings from Last 3 Encounters:   11/28/18 197 lb (89.4 kg)   01/10/18 191 lb 1.6 oz (86.7 kg)   10/25/17 183 lb 3.2 oz (83.1 kg)                  Labs reviewed in EPIC    Reviewed and updated as needed this visit by clinical staff       Reviewed and updated as needed this visit by Provider         ROS:  CONSTITUTIONAL:as above  INTEGUMENTARY/SKIN: NEGATIVE for worrisome rashes, moles or lesions  RESP: NEGATIVE for significant cough or SOB  CV: NEGATIVE for chest pain, palpitations or peripheral edema  CV: Hx HTN  GI: NEGATIVE for nausea, abdominal pain, heartburn, or change in bowel habits  MUSCULOSKELETAL: NEGATIVE for significant arthralgias or myalgia  NEURO: NEGATIVE for weakness, dizziness or paresthesias  ENDOCRINE: NEGATIVE for temperature intolerance, skin/hair changes  HEME/ALLERGY/IMMUNE: NEGATIVE for bleeding problems  PSYCHIATRIC: NEGATIVE for changes in mood or affect    OBJECTIVE:     BP (!) 143/97 (BP Location: Right arm, Patient Position: Sitting, Cuff Size: Adult Regular)  Pulse 88  Temp 98.3  F (36.8  C) (Tympanic)  Wt 197 lb (89.4 kg)  SpO2 97%  BMI 29.52 kg/m2  Body mass index is 29.52 kg/(m^2).  GENERAL: healthy, alert and no distress  NECK: no adenopathy, no asymmetry, masses, or scars and thyroid normal to palpation  RESP: lungs clear to auscultation - no rales, rhonchi or wheezes  CV: regular rate and rhythm, normal S1 S2, no S3 or S4, no murmur, click or rub, no peripheral edema and peripheral pulses strong  MS: no gross  "musculoskeletal defects noted, no edema  NEURO: Normal strength and tone, mentation intact and speech normal  PSYCH: mentation appears normal, affect normal/bright    Diagnostic Test Results:  none     ASSESSMENT/PLAN:     Hypertension; slightly worsened   Associated with the following complications:    None   Plan:  As mentioned below      BMI:   Estimated body mass index is 29.52 kg/(m^2) as calculated from the following:    Height as of 1/10/18: 5' 8.5\" (1.74 m).    Weight as of this encounter: 197 lb (89.4 kg).   Weight management plan: Discussed healthy diet and exercise guidelines      1. Hypertension goal BP (blood pressure) < 140/90  BP Readings from Last 6 Encounters:   11/28/18 (!) 143/97   01/10/18 134/85   10/25/17 (!) 170/98       Blood pressure is not at goal.  Recommended to stop taking losartan 25 mg  Continue with amlodipine 10 mg at bedtime  Start on HYZAAR  Dosing and potential medication side effects discussed.  Follow for blood pressure recheck with the staff in 2 weeks along with BMP lab check  Will follow low salt diet, weight loss and regular exercises.  Get sleep study to check for sleep apnea  Reviewed lab results the patient brought today from work including normal BMP labs, normal thyroid, normal fasting cholesterol- 7/30/18  We will check for urine microalbumin today which was not done at his work  Patient verbalised understanding and is agreeable to the plan.    - losartan-hydrochlorothiazide (HYZAAR) 50-12.5 MG per tablet; Take 1 tablet by mouth daily  Dispense: 30 tablet; Refill: 1  - CBC with platelets and differential  - Albumin Random Urine Quantitative with Creat Ratio  - SLEEP EVALUATION & MANAGEMENT REFERRAL - ADULT -[facility] [place]  [phone] (Age 15 and up); Future  - amLODIPine (NORVASC) 10 MG tablet; Take 1 tablet (10 mg) by mouth daily  Dispense: 90 tablet; Refill: 1    2. Leukocytosis, unspecified type  Reviewed elevated white cell count of 11.5 " but no differential was done at work  Recommended to have a CBC with differential done today  Lab Results   Component Value Date    WBC 11.9 11/28/2018     Lab Results   Component Value Date    RBC 5.28 11/28/2018     Lab Results   Component Value Date    HGB 15.6 11/28/2018     Lab Results   Component Value Date    HCT 46.0 11/28/2018     No components found for: MCT  Lab Results   Component Value Date    MCV 87 11/28/2018     Lab Results   Component Value Date    MCH 29.5 11/28/2018     Lab Results   Component Value Date    MCHC 33.9 11/28/2018     Lab Results   Component Value Date    RDW 12.6 11/28/2018     Lab Results   Component Value Date     11/28/2018     Reviewed jaskaran CBC except for slightly elevated WBC, but differentials are jaskaran.  Will monitor, recheck at [facility] in 3 months  - CBC with platelets and differential    3. Snoring  Along with uncontrolled hypertension and fatigue, recommended patient to have sleep study done for further evaluation to rule out sleep apnea  Patient verbalised understanding and is agreeable to the plan.    - SLEEP EVALUATION & MANAGEMENT REFERRAL - [facility]  [phone] (Age 15 and up); Future    4. Other fatigue  ddx-anemia/uncontrolled hypertension/thyroid disorder/metabolic/sleep apnea  Reviewed normal hemoglobin from 3 months ago, normal BMP lab results, normal thyroid functions  Expect improvement with improving blood pressure and ruling of sleep apnea  Recommended to continue with regular exercises and healthy eating  - SLEEP EVALUATION & MANAGEMENT REFERRAL - [facility]  [phone] (Age 15 and up); Future    Work on weight loss  Regular exercise  Chart documentation done in part with Dragon Voice recognition Software. Although reviewed after completion, some word and grammatical error may remain.    See Patient Instructions    Jose Holden MD  [facility]

## 2019-01-11 ENCOUNTER — ALLIED HEALTH/NURSE VISIT (OUTPATIENT)
Dept: PEDIATRICS | Facility: CLINIC | Age: 39
End: 2019-01-11
Payer: COMMERCIAL

## 2019-01-11 VITALS — SYSTOLIC BLOOD PRESSURE: 133 MMHG | HEART RATE: 88 BPM | DIASTOLIC BLOOD PRESSURE: 89 MMHG

## 2019-01-11 DIAGNOSIS — I10 HYPERTENSION GOAL BP (BLOOD PRESSURE) < 140/90: ICD-10-CM

## 2019-01-11 LAB
ANION GAP SERPL CALCULATED.3IONS-SCNC: 9 MMOL/L (ref 3–14)
BUN SERPL-MCNC: 19 MG/DL (ref 7–30)
CALCIUM SERPL-MCNC: 8.6 MG/DL (ref 8.5–10.1)
CHLORIDE SERPL-SCNC: 101 MMOL/L (ref 94–109)
CO2 SERPL-SCNC: 26 MMOL/L (ref 20–32)
CREAT SERPL-MCNC: 0.89 MG/DL (ref 0.66–1.25)
GFR SERPL CREATININE-BSD FRML MDRD: >90 ML/MIN/{1.73_M2}
GLUCOSE SERPL-MCNC: 101 MG/DL (ref 70–99)
POTASSIUM SERPL-SCNC: 3.8 MMOL/L (ref 3.4–5.3)
SODIUM SERPL-SCNC: 136 MMOL/L (ref 133–144)

## 2019-01-11 PROCEDURE — 80048 BASIC METABOLIC PNL TOTAL CA: CPT | Performed by: FAMILY MEDICINE

## 2019-01-11 PROCEDURE — 36415 COLL VENOUS BLD VENIPUNCTURE: CPT | Performed by: FAMILY MEDICINE

## 2019-01-11 PROCEDURE — 99211 OFF/OP EST MAY X REQ PHY/QHP: CPT

## 2019-01-11 RX ORDER — LOSARTAN POTASSIUM AND HYDROCHLOROTHIAZIDE 12.5; 5 MG/1; MG/1
1 TABLET ORAL DAILY
Qty: 90 TABLET | Refills: 1 | Status: SHIPPED | OUTPATIENT
Start: 2019-01-11 | End: 2019-10-27

## 2019-01-11 NOTE — RESULT ENCOUNTER NOTE
Dear Sin,  Your lab test showed normal kidney functions and normal electrolytes, this is reassuring.   Let me know if you have any questions. Take care.  Jose Holden MD

## 2019-01-11 NOTE — PROGRESS NOTES
Patient presents for a blood pressure check today per . He was started on Hyzaar on 11/28/2018. He states he is not having any side effects from the Hyzaar and home BP readings are 120/80 range.     Blood Pressure today is at goal : 131/88    BMP is normal with a non fasting glucose of 101.    Refilled Hyzaar for 6 months. He will follow up in clinic in 6 months with PCP.     He agrees with plan.     Sin Murrieta Jr. comes into clinic today at the request of  Ordering Provider for BP Check: BP at visit: 131/88.    See above for plan of care.     This service provided today was under the supervising provider of the day , who was available if needed.    Erica Olivares RN

## 2019-04-23 ENCOUNTER — OFFICE VISIT (OUTPATIENT)
Dept: PEDIATRICS | Facility: CLINIC | Age: 39
End: 2019-04-23
Payer: COMMERCIAL

## 2019-04-23 VITALS
WEIGHT: 191.6 LBS | SYSTOLIC BLOOD PRESSURE: 131 MMHG | BODY MASS INDEX: 28.38 KG/M2 | OXYGEN SATURATION: 96 % | TEMPERATURE: 98.3 F | HEIGHT: 69 IN | HEART RATE: 79 BPM | DIASTOLIC BLOOD PRESSURE: 76 MMHG

## 2019-04-23 DIAGNOSIS — L20.89 OTHER ATOPIC DERMATITIS: Primary | ICD-10-CM

## 2019-04-23 DIAGNOSIS — Z30.09 ENCOUNTER FOR VASECTOMY COUNSELING: ICD-10-CM

## 2019-04-23 DIAGNOSIS — N64.4 MASTODYNIA: ICD-10-CM

## 2019-04-23 PROCEDURE — 99214 OFFICE O/P EST MOD 30 MIN: CPT | Performed by: FAMILY MEDICINE

## 2019-04-23 RX ORDER — BETAMETHASONE DIPROPIONATE 0.5 MG/G
CREAM TOPICAL 2 TIMES DAILY
Qty: 50 G | Refills: 0 | Status: SHIPPED | OUTPATIENT
Start: 2019-04-23 | End: 2020-12-15

## 2019-04-23 ASSESSMENT — MIFFLIN-ST. JEOR: SCORE: 1785.5

## 2019-04-23 ASSESSMENT — PAIN SCALES - GENERAL: PAINLEVEL: NO PAIN (0)

## 2019-04-23 NOTE — PROGRESS NOTES
SUBJECTIVE:   Sin Murrieta Jr. is a 38 year old male who presents to clinic today for the following   health issues:    Rash    Patient with no significant past medical history is here with concerns of having unresolving, erythematous, severely itchy dry rash on the outer aspect of the right leg for the past 4-5 months.  Has tried over-the-counter cortisone cream with no relief.  Patient has history of psoriasis rash on the same leg that was diagnosed 4-5 years ago, treated with stronger dose of hydrocortisone cream with good relief of symptoms.    Onset: 4-5 months    Description:   Location: right leg  Character: round, blotchy, red  Itching (Pruritis): YES    Progression of Symptoms:  worsening    Accompanying Signs & Symptoms:  Fever: no   Body aches or joint pain: no   Sore throat symptoms: no   Recent cold symptoms: no     History:   Previous similar rash: YES- 4-5 years with psorsis in same spot    Precipitating factors:   Exposure to similar rash: no   New exposures: None   Recent travel: no     Alleviating factors:  None    Therapies Tried and outcome: Body Lotion       Right chest pain  Complaining of pain around the right nipple over the breast tissue on the right side for the past 2-3 weeks with no associated concerns for nipple discharge, right-sided symptoms, history of trauma, cough, shortness of breath, palpitations, previous similar symptoms in the past.  Patient denies recent exertional activities prior to onset of pain  Denies concerns for heartburn, reflux, nausea, vomiting, abdominal symptoms     Onset: 3 weeks    Description:   Location:  right side by nipple  Character: sharp with pressure  Radiation: none  Duration: a few seconds or with pressire     Intensity: mild    Progression of Symptoms:  intermittent    Accompanying Signs & Symptoms:  Shortness of breath: no  Sweating: no   Nausea/vomiting: no  Lightheadedness: no  Palpitations: no  Fever/Chills: no   Cough: no   Heartburn: no      History:   Family history of heart disease no   Tobacco use: no    Precipitating factors:   Worse with exertion: have not tried  Worse with deep breaths :  have not tried  Related to food: no    Alleviating factors:  None       Therapies Tried and outcome: None      Referral Request - Vasectomy  Patient has 2 children 6 and 4 years old, is wishing to have vasectomy for permanent contraception        Additional history: as documented    Reviewed  and updated as needed this visit by clinical staff         Reviewed and updated as needed this visit by Provider         Patient Active Problem List   Diagnosis     Hypertension goal BP (blood pressure) < 140/90     CARDIOVASCULAR SCREENING; LDL GOAL LESS THAN 160     Overweight (BMI 25.0-29.9)     Common wart, hand     Leukocytosis, unspecified type     Past Surgical History:   Procedure Laterality Date     HERNIA REPAIR         Social History     Tobacco Use     Smoking status: Former Smoker     Smokeless tobacco: Never Used   Substance Use Topics     Alcohol use: Yes     Comment: weekends     Family History   Problem Relation Age of Onset     Cerebrovascular Disease Father      Hepatitis Brother          Current Outpatient Medications   Medication Sig Dispense Refill     amLODIPine (NORVASC) 10 MG tablet Take 1 tablet (10 mg) by mouth daily 90 tablet 1     augmented betamethasone dipropionate (DIPROLENE-AF) 0.05 % external cream Apply topically 2 times daily 50 g 0     losartan-hydrochlorothiazide (HYZAAR) 50-12.5 MG tablet Take 1 tablet by mouth daily 90 tablet 1     No Known Allergies  Recent Labs   Lab Test 01/11/19  0821 07/30/18 01/10/18  0707   LDL  --  111* 69   HDL  --  49 41   TRIG  --  144 235*   ALT  --  34 46   CR 0.89 0.78 0.97   GFRESTIMATED >90 116 87   GFRESTBLACK >90 134 >90   POTASSIUM 3.8 4.3 3.9   TSH  --   --  1.88      BP Readings from Last 3 Encounters:   04/23/19 131/76   01/11/19 133/89   11/28/18 (!) 143/97    Wt Readings from Last 3  "Encounters:   04/23/19 86.9 kg (191 lb 9.6 oz)   11/28/18 89.4 kg (197 lb)   01/10/18 86.7 kg (191 lb 1.6 oz)                  Labs reviewed in EPIC    ROS:  CONSTITUTIONAL: NEGATIVE for fever, chills, change in weight  INTEGUMENTARY/SKIN: as above  RESP: NEGATIVE for significant cough or SOB  BREAST: as above  CV: NEGATIVE for chest pain, palpitations or peripheral edema  GI-negative  PSYCHIATRIC: NEGATIVE for changes in mood or affect    OBJECTIVE:     /76 (BP Location: Right arm, Patient Position: Sitting, Cuff Size: Adult Large)   Pulse 79   Temp 98.3  F (36.8  C) (Oral)   Ht 1.746 m (5' 8.75\")   Wt 86.9 kg (191 lb 9.6 oz)   SpO2 96%   BMI 28.50 kg/m    Body mass index is 28.5 kg/m .  GENERAL: healthy, alert and no distress  RESP: lungs clear to auscultation - no rales, rhonchi or wheezes  BREAST:left normal without masses, tenderness or nipple discharge and no palpable axillary masses or adenopathy  BREAST: Minimal tenderness over the breast tissue on the right upper quadrant at the 12 to 1 o'clock position, no nipple discharge, no palpable axillary masses or adenopathy  CV: regular rate and rhythm, normal S1 S2, no S3 or S4, no murmur, click or rub, no peripheral edema and peripheral pulses strong  CV: No reproducible chest wall or costochondral tenderness  ABDOMEN: soft, nontender, no hepatosplenomegaly, no masses and bowel sounds normal  MS: no gross musculoskeletal defects noted, no edema  SKIN: Large 10 cm oval patch of dry erythematous macular area on the right lateral leg  PSYCH: mentation appears normal, affect normal/bright    Diagnostic Test Results:  none     ASSESSMENT/PLAN:             1. Other atopic dermatitis  Reviewed the etiology of atopic dermatitis, gave education handouts on the same.  Emphasized on daily moisturizer use, prescription given for Diprolene cream to use twice daily for the next 2-3 weeks patient will update through my chart if no better in 3 weeks or sooner if " needed.  Dosing and potential medication side effects discussed.  Will consider dermatology consult  for persistent or worsening concerns  Patient verbalised understanding and is agreeable to the plan.    - augmented betamethasone dipropionate (DIPROLENE-AF) 0.05 % external cream; Apply topically 2 times daily  Dispense: 50 g; Refill: 0    2. Encounter for vasectomy counseling  Patient is referred for neurology consult for vasectomy  - UROLOGY ADULT REFERRAL    3. Mastodynia  ddx-gynecomastia  Recommended patient to monitor for the next 2-3 weeks if pain is not any better or gets worse, he will call to schedule for right breast ultrasound for further evaluation  - US Breast Right Complete 4 Quadrants; Future    Chart documentation done in part with Dragon Voice recognition Software. Although reviewed after completion, some word and grammatical error may remain.    See Patient Instructions    Jose Holden MD  [facility]

## 2019-04-23 NOTE — PATIENT INSTRUCTIONS
Schedule for urology consult  Call to schedule for the scan if you continue to have pain the right breast area  Start on topical steroid cream for 2 weeks    Patient Education     Atopic Dermatitis (Adult)  Atopic dermatitis is a dry, itchy, red rash. It s also called eczema. The rash is chronic, or ongoing. It can come and go over time. The disease is often passed down in families. It causes a problem with the skin barrier that makes the skin more sensitive to the environment and other factors. The increased skin sensitivity causes an itch, which causes scratching. Scratching can worsen the itching or also break the skin. This can put the skin at risk of infection.  The condition is most common in people with asthma, hay fever, hives, or dry or sensitive skin. The rash may be caused by extreme heat or heavy sweating. Skin irritants can cause the rash to flare up. These can include wool or silk clothing, grease, oils, some medicines, and harsh soaps and detergents. Emotional stress can also be a trigger.  Treatment is done to relieve the itching and inflammation of the skin.  Home care  Follow these tips to care for your condition:    Keep the areas of rash clean by bathing at least every other day. Use lukewarm water to bathe. Don t use hot water, which can dry out the skin.    Don t use soaps with strong detergents. Use mild soaps made for sensitive skin.    Apply a cream or ointment to damp skin right after bathing.    Avoid things that irritate your skin. Wear absorbent, soft fabrics next to the skin rather than rough or scratchy materials.    Use mild laundry soap free of scents and perfumes. Make sure to rinse all the soap out of your clothes.    Treat any skin infection as directed.    Use oral diphenhydramine to help reduce itching. This is an antihistamine you can buy at drug and grocery stores. It can make you sleepy, so use lower doses during the daytime. Or you can use loratadine. This is an  antihistamine that will not make you sleepy. Do not use diphenhydramine if you have glaucoma or have trouble urinating due to an enlarged prostate.  Follow-up care  See your healthcare provider, or as advised. If your symptoms don t get better or if they get worse in the next 7 days, make an appointment with your healthcare provider.  When to seek medical advice  Call your healthcare provider right away  if any of these occur:    Increasing area of redness or pain in the skin    Yellow crusts or wet drainage from the rash    Fever of 100.4 F (38 C) or higher, or as directed by your healthcare provider  Date Last Reviewed: 9/1/2016 2000-2018 [employer]. [address] [place] [address], [place] [address]. All rights reserved. This information is not intended as a substitute for professional medical care. Always follow your healthcare professional's instructions.           Patient Education     What is Atopic Dermatitis?  Atopic dermatitis (also called eczema) causes chronic skin irritation. It is often found in infants, teens, and adults. This disease often runs in families (is genetic). It may also be linked to allergies, such as hay fever and sometimes asthma. Patches of skin become dry, red, itchy, and scaly. In older adults, abnormally dry skin is often called xerosis. Sometimes eczema is only on the hands or feet. It often improves when the skin is well hydrated. It gets worse when the skin is dry. You can help control symptoms by practicing good self-care. Avoid anything that causes flare-ups (such as sunburn or vigorous scratching).  Where do you have symptoms?  Atopic dermatitis symptoms can appear anywhere on the body. But in most cases they vary based on the person s age. In infants, irritation is often seen on the cheeks, chin, near the mouth, and under the eyelids. In children ages 2 through 10, skin folds, such as the backs of the knees, or in the arm crease, are most often affected. In children  11 and older and in adults, symptoms can affect many areas.  What triggers symptoms?  Symptoms flare because of many things. These include skin dryness, scratching, stress, harsh soaps, and irritants such as dust or wool. Try to avoid anything that causes flare-ups.  Recognizing what causes flare-ups  To figure out what causes atopic dermatitis to flare, keep a list of things that seem to affect your skin. Start by filling in the spaces below. Then keep writing them down in a notebook or diary. The things that affect each person vary. So keep your own list and try to avoid your triggers.     ________________________________________________     ________________________________________________     ________________________________________________  Date Last Reviewed: 2/1/2017 2000-2018 [employer]. [address] [place] [address], [place] [address]. All rights reserved. This information is not intended as a substitute for professional medical care. Always follow your healthcare professional's instructions.

## 2019-06-05 ENCOUNTER — ANCILLARY PROCEDURE (OUTPATIENT)
Dept: MAMMOGRAPHY | Facility: CLINIC | Age: 39
End: 2019-06-05
Attending: FAMILY MEDICINE
Payer: COMMERCIAL

## 2019-06-05 DIAGNOSIS — N64.4 MASTODYNIA: ICD-10-CM

## 2019-06-05 PROCEDURE — 77066 DX MAMMO INCL CAD BI: CPT

## 2019-06-05 NOTE — LETTER
Sin Murrieta  [address] [address] [place] [id number]      June 5, 2019  Date of Exam:       Dear Sin:    Thank you for your recent visit.    Mammogram Result: Normal. We are pleased to inform you that the interpretation of your recent mammography did not find cancer. Please check with your health care provider for instructions on follow-up based on your medical history and physical exam findings.    Breast Problems: If you are experiencing any breast problems such as a lump or localized pain, we request that you discuss this with your health care provider if you haven t already done so, as additional testing may be necessary.    Mammogram Records: A report of your mammogram results was sent to the health care provider you indicated. Your mammogram and report will become part of your medical file here at [facility] for at least 10 years. You are responsible for informing any new health care provider or mammography facility of the date and location of this examination.     We appreciate the opportunity to participate in your health care.    Sincerely,    Dr. Roberson  Interpreting Radiologist  [facility]

## 2019-06-25 NOTE — TELEPHONE ENCOUNTER
Left message:  Informed patient we got a call from his [facility] informing us the Valsartan has been recalled.  We have sent an alternative Losartan to the pharmacy.  Please schedule nurse only BP visit to check BP couple weeks after starting the Losartan.  If further questions to contact the clinic.    Alessia Chacon RN,   [facility], [facility]      
Losartan sent in   BP check only in a couple weeks of new medication   
Received fax from pharmacy valsartan hs been recalled and is not available to order at this time. Please send a new rx for a different blood pressure medication as soon as possible. Thank you. Please advise.    AMISHA Lloyd    
no vomiting/no diarrhea/no nausea/no constipation

## 2019-07-18 ENCOUNTER — TRANSFERRED RECORDS (OUTPATIENT)
Dept: HEALTH INFORMATION MANAGEMENT | Facility: CLINIC | Age: 39
End: 2019-07-18

## 2019-10-01 ENCOUNTER — OFFICE VISIT (OUTPATIENT)
Dept: UROLOGY | Facility: CLINIC | Age: 39
End: 2019-10-01
Payer: COMMERCIAL

## 2019-10-01 VITALS — DIASTOLIC BLOOD PRESSURE: 98 MMHG | SYSTOLIC BLOOD PRESSURE: 148 MMHG

## 2019-10-01 DIAGNOSIS — I10 HYPERTENSION GOAL BP (BLOOD PRESSURE) < 140/90: ICD-10-CM

## 2019-10-01 DIAGNOSIS — Z30.09 VASECTOMY EVALUATION: Primary | ICD-10-CM

## 2019-10-01 DIAGNOSIS — N47.8 EXCESSIVE FORESKIN: ICD-10-CM

## 2019-10-01 PROCEDURE — 99202 OFFICE O/P NEW SF 15 MIN: CPT | Performed by: UROLOGY

## 2019-10-01 NOTE — PATIENT INSTRUCTIONS
Your Vasectomy is scheduled 10/22/2019 at 2:30.  Please call [phone] if you need to reschedule this appointment or if you have any question.     Preparation for Vasectomy:  Shave the hair away from the base of your penis and around your testicles.  Wear snug underwear the day of the vasectomy to support your testicles.  Do not take aspirin, ibuprofen, advil, motrin, aleve products one week prior to your vasectomy.      General Vasectomy Information    Vasectomy is a surgery.  If it is successful, it will be impossible for you to ever father children.  The following information regards the male sterilization done by an operation called a vasectomy.  This is done in the physician's office.    The operation done to sterilize the male is easier, safer and much less expensive than the operation done to sterilize the woman.    Sterilization should be considered permanent.  For most males, once the operation is done, it can never me undone.  There are attempts made occasionally to reconnect the tubes that have been cut during the procedure, but this is very difficult and expensive and works only about 50-70% of the time.  In order for any of the physicians in our clinic to do a vasectomy, we require that you consider this a permanent form a sterilization.    A vasectomy can be done at any time, but it is best to think about having it done when you can take at least one day off from work and any excess activities.    Your decision to have a vasectomy should only be made with the following facts clear in mind.    1. First, a vasectomy is only one of several means of birth control.  Many form of temporary contraception are available.  If you have any questions about other methods, please discuss this with your physician.    2. A vasectomy may be unsuccessful in approximately one out of 1000 couples per year.  This occurs when the tubes which are cut during the procedure reconnect themselves.  Sterility cannot be  guaranteed.    3. You should be aware that it is the current belief of the medical profession that a vasectomy procedure does not alter a male physically, physiologically or sexually.  Because each person is a unique individual, there is always the possibility of an adverse phychiatric reaction.  This can be best avoided by being very comfortable in your own mind that you want to have this done, and that you do not want to father any children in the future.  If this is not clear in your mind, this should be further discussed with your physician.    4. You will not notice a change in the volume of your ejaculate since sperm is a very small amount of the semen and it is only the sperm that is stopped from entering the ejaculate after a vasectomy.  Your prostate and seminal vesicle glands really supply most of the semen and this is not at all decreased after a vasectomy.  Also there is no effect on the male hormones.    5. You do not become sterile immediately following a vasectomy due to the fact that there is still sperm remaining in your system that must be eliminated by ejaculation.  For this reason, your sexual partner could still become pregnant for a period of time following the vasectomy operation.  It is necessary that contraceptive measures be used until you receive confirmation from your physician that you are sterile.  It takes approximately 12 ejaculations to clear the semen of sperm, but this can differ in different men.  For this reason, it is very important that your semen be checked for sperm before you are considered sterile, and this should be done approximately 12 weeks after your vasectomy.      6. Vasectomy has risks and benefits.  Among the risks are possible complications resulting from the procedure.  These risks include but are not limited to:   A.  Bleeding, infection, or hematoma occuring during or in the recovery period   from the procedure.   B.  Sperm granuloma or a small pea to walnut  sized lump which is a collection of   scar tissue and sperm in your scrotal sack and remains permanently   C.  There may be an increased risk of prostate cancer although the data is   unclear.

## 2019-10-01 NOTE — PROGRESS NOTES
Vasectomy Consult    Reason for visit:   Discuss as a method of birth control/sterilization per Dr. Holden's request for consultation.    Patient also wants to discuss circumcision.  He is not circumcised.  After sexual activities, he has difficulty with retracting his foreskin.  He has no problem with urinations.  He is interested in vasectomy scrotally.  Patient has 2 children and desires sterilization.  Does not want to use condom or having partners on birth control pills.  He has no erections problem.  He has no urinary complaints.    Current Outpatient Medications   Medication Sig Dispense Refill     amLODIPine (NORVASC) 10 MG tablet Take 1 tablet (10 mg) by mouth daily 90 tablet 1     augmented betamethasone dipropionate (DIPROLENE-AF) 0.05 % external cream Apply topically 2 times daily 50 g 0     losartan-hydrochlorothiazide (HYZAAR) 50-12.5 MG tablet Take 1 tablet by mouth daily 90 tablet 1     No Known Allergies  Past Medical History:   Diagnosis Date     Hypertension      Past Surgical History:   Procedure Laterality Date     HERNIA REPAIR        Family History   Problem Relation Age of Onset     Cerebrovascular Disease Father      Hepatitis Brother      Social History     Socioeconomic History     Marital status:      Spouse name: None     Number of children: None     Years of education: None     Highest education level: None   Occupational History     None   Social Needs     Financial resource strain: None     Food insecurity:     Worry: None     Inability: None     Transportation needs:     Medical: None     Non-medical: None   Tobacco Use     Smoking status: Former Smoker     Smokeless tobacco: Never Used   Substance and Sexual Activity     Alcohol use: Yes     Comment: weekends     Drug use: No     Sexual activity: Yes     Partners: Female   Lifestyle     Physical activity:     Days per week: None     Minutes per session: None     Stress: None   Relationships     Social connections:     Talks on  phone: None     Gets together: None     Attends [employer] service: None     Active member of club or organization: None     Attends meetings of clubs or organizations: None     Relationship status: None     Intimate partner violence:     Fear of current or ex partner: None     Emotionally abused: None     Physically abused: None     Forced sexual activity: None   Other Topics Concern     Parent/sibling w/ CABG, MI or angioplasty before 65F 55M? Yes   Social History Narrative     None       REVIEW OF SYSTEMS  =================  C: NEGATIVE for fever, chills, change in weight  I: NEGATIVE for worrisome rashes, moles or lesions  E/M: NEGATIVE for ear, mouth and throat problems  R: NEGATIVE for significant cough or SHORTNESS OF BREATH  CV:  NEGATIVE for chest pain, palpitations or peripheral edema  GI: NEGATIVE for nausea, abdominal pain, heartburn, or change in bowel habits  NEURO: NEGATIVE numbness/weakness  : see HPI  PSYCH: NEGATIVE depression/anxiety  LYmph: no new enlarged lymph nodes  Ortho: no new trauma/movements      Physical Exam:  BP (!) 148/98 (BP Location: Right arm, Patient Position: Chair, Cuff Size: Adult Regular)    Patient is pleasant, in no acute distress, good general condition.  HEENT:  Normalcephalic, atraumatic  Lung: no evidence of respiratory distress    Abdomen: Soft, nondistended, non tender. No masses. No rebound or guarding.   Exam: penis no d/c testis no masses bilateral vas palpated no scrotal lesion.  Penis with excess foreskin.  Skin: Warm and dry.  No redness.  Neuro: grossly normal  Psych normal mood and affect  Musculoskeletal  moving all extremities        Discussed    That vasectomy is permanent method of birth control.    That vasectomy can fail due to recanalization of the vas even many months/years later.    That he needs 2 negative sperm checks to be considered sterile    That there are other methods that are not permanent and also that the sperm can be frozen for later  use.    How the technique is performed, risks of infection, bleeding, damage to the testes vessels and testes atrophy    Long term complication such as chronic and difficult to treat testes pain and questionable increase incidence of prostate cancer    That the procedure can be done at the clinic or hospital OR    Also discussed with patient about circumcision.  Pros and cons discussed.  Patient decided to hold off for now.    Plan:    Stop Aspirin  Will schedule Vasectomy in the future    HTN: Sin to follow up with Primary Care provider regarding elevated blood pressure.

## 2019-10-21 ENCOUNTER — HOSPITAL ENCOUNTER (OUTPATIENT)
Facility: AMBULATORY SURGERY CENTER | Age: 39
End: 2019-10-21
Attending: UROLOGY | Admitting: UROLOGY
Payer: COMMERCIAL

## 2019-10-21 ENCOUNTER — PREP FOR PROCEDURE (OUTPATIENT)
Dept: UROLOGY | Facility: CLINIC | Age: 39
End: 2019-10-21

## 2019-10-21 ENCOUNTER — TELEPHONE (OUTPATIENT)
Dept: UROLOGY | Facility: CLINIC | Age: 39
End: 2019-10-21

## 2019-10-21 DIAGNOSIS — N47.1 PHIMOSIS: ICD-10-CM

## 2019-10-21 DIAGNOSIS — N47.1 PHIMOSIS: Primary | ICD-10-CM

## 2019-10-21 DIAGNOSIS — Z30.2 ENCOUNTER FOR VASECTOMY: ICD-10-CM

## 2019-10-21 RX ORDER — CEFAZOLIN SODIUM 1 G
1 VIAL (EA) INJECTION SEE ADMIN INSTRUCTIONS
Status: CANCELLED | OUTPATIENT
Start: 2019-10-21

## 2019-10-21 NOTE — TELEPHONE ENCOUNTER
Reason for Call:  Other appointment and call back    Detailed comments: Pt. Called to reschedule his procedure. Wants it around 12/06/19, later time if possible.    Side note: I talked to Leeann about the appointment I rescheduled him for. And was told to urology staff can only reschedule procedures, so just a heads up I have the appointment still in his future appointments. Also pt was notified before I got off the phone with him that someone may need to reschedule the appointment with him.    Phone Number Patient can be reached at: Home number on file [phone] (home)    Best Time: any    Can we leave a detailed message on this number? YES    Call taken on 10/21/2019 at 11:05 AM by Tyson Martinez

## 2019-10-21 NOTE — TELEPHONE ENCOUNTER
Type of surgery: Vasectomy, Circumcision  [address], [id number]   Vasectomy evaluation Z30.09         Excessive foreskin N47.8     Location of surgery: [facility]  Date and time of surgery: 12-2-19  Surgeon: Dr. Orlando  Pre-Op Appt Date: 11-26-19  Post-Op Appt Date: TBD   Packet sent out: Yes  Pre-cert/Authorization completed:   Call to [employer] I spoke to Destiney, no prior auth is needed but there is a co pay of  $400 per procedure, they are not sure if it will be $400 or $800. Call to patient, I explained the co pays and he will follow up with insurance.   Date: 10/22/2019    Thank you,   Sandra Cerda   [facility] Department  [phone]

## 2019-10-27 ENCOUNTER — MYC REFILL (OUTPATIENT)
Dept: PEDIATRICS | Facility: CLINIC | Age: 39
End: 2019-10-27

## 2019-10-27 DIAGNOSIS — I10 HYPERTENSION GOAL BP (BLOOD PRESSURE) < 140/90: ICD-10-CM

## 2019-10-28 RX ORDER — LOSARTAN POTASSIUM AND HYDROCHLOROTHIAZIDE 12.5; 5 MG/1; MG/1
1 TABLET ORAL DAILY
Qty: 90 TABLET | Refills: 0 | Status: SHIPPED | OUTPATIENT
Start: 2019-10-28 | End: 2019-11-26

## 2019-10-28 RX ORDER — AMLODIPINE BESYLATE 10 MG/1
10 TABLET ORAL DAILY
Qty: 90 TABLET | Refills: 0 | Status: SHIPPED | OUTPATIENT
Start: 2019-10-28 | End: 2019-11-26

## 2019-10-28 NOTE — TELEPHONE ENCOUNTER
LOV- 4/23. (10/1 BP was elevated due to vasectomy consult.) Tori to 11/26 appt.    Viky Oliva RN

## 2019-11-18 NOTE — TELEPHONE ENCOUNTER
Patient left a voice message that he would like to change surgery dates, I have left a voice message for patient to call and reschedule. [phone]

## 2019-11-18 NOTE — TELEPHONE ENCOUNTER
Patient has been rescheduled from 12-2-19 to 12-16-19 @ [facility].

## 2019-11-26 ENCOUNTER — OFFICE VISIT (OUTPATIENT)
Dept: PEDIATRICS | Facility: CLINIC | Age: 39
End: 2019-11-26
Payer: COMMERCIAL

## 2019-11-26 VITALS
BODY MASS INDEX: 29.22 KG/M2 | HEIGHT: 69 IN | DIASTOLIC BLOOD PRESSURE: 80 MMHG | HEART RATE: 82 BPM | WEIGHT: 197.3 LBS | SYSTOLIC BLOOD PRESSURE: 116 MMHG | OXYGEN SATURATION: 97 % | TEMPERATURE: 98.5 F

## 2019-11-26 DIAGNOSIS — Z30.2 ENCOUNTER FOR VASECTOMY: ICD-10-CM

## 2019-11-26 DIAGNOSIS — J06.9 UPPER RESPIRATORY TRACT INFECTION, UNSPECIFIED TYPE: ICD-10-CM

## 2019-11-26 DIAGNOSIS — R74.01 ELEVATED ALT MEASUREMENT: ICD-10-CM

## 2019-11-26 DIAGNOSIS — I10 HYPERTENSION GOAL BP (BLOOD PRESSURE) < 140/90: ICD-10-CM

## 2019-11-26 DIAGNOSIS — Z01.818 PREOP GENERAL PHYSICAL EXAM: Primary | ICD-10-CM

## 2019-11-26 DIAGNOSIS — N47.1 PHIMOSIS: ICD-10-CM

## 2019-11-26 LAB
ALBUMIN SERPL-MCNC: 3.9 G/DL (ref 3.4–5)
ALP SERPL-CCNC: 120 U/L (ref 40–150)
ALT SERPL W P-5'-P-CCNC: 53 U/L (ref 0–70)
ANION GAP SERPL CALCULATED.3IONS-SCNC: 3 MMOL/L (ref 3–14)
AST SERPL W P-5'-P-CCNC: 20 U/L (ref 0–45)
BASOPHILS # BLD AUTO: 0.1 10E9/L (ref 0–0.2)
BASOPHILS NFR BLD AUTO: 0.5 %
BILIRUB SERPL-MCNC: 0.3 MG/DL (ref 0.2–1.3)
BUN SERPL-MCNC: 16 MG/DL (ref 7–30)
CALCIUM SERPL-MCNC: 8.8 MG/DL (ref 8.5–10.1)
CHLORIDE SERPL-SCNC: 107 MMOL/L (ref 94–109)
CO2 SERPL-SCNC: 28 MMOL/L (ref 20–32)
CREAT SERPL-MCNC: 0.81 MG/DL (ref 0.66–1.25)
CREAT UR-MCNC: 142 MG/DL
DIFFERENTIAL METHOD BLD: ABNORMAL
EOSINOPHIL # BLD AUTO: 0.3 10E9/L (ref 0–0.7)
EOSINOPHIL NFR BLD AUTO: 1.9 %
ERYTHROCYTE [DISTWIDTH] IN BLOOD BY AUTOMATED COUNT: 12.5 % (ref 10–15)
GFR SERPL CREATININE-BSD FRML MDRD: >90 ML/MIN/{1.73_M2}
GLUCOSE SERPL-MCNC: 92 MG/DL (ref 70–99)
HCT VFR BLD AUTO: 46.4 % (ref 40–53)
HGB BLD-MCNC: 15.8 G/DL (ref 13.3–17.7)
IMM GRANULOCYTES # BLD: 0.1 10E9/L (ref 0–0.4)
IMM GRANULOCYTES NFR BLD: 0.8 %
LYMPHOCYTES # BLD AUTO: 3.3 10E9/L (ref 0.8–5.3)
LYMPHOCYTES NFR BLD AUTO: 25.5 %
MCH RBC QN AUTO: 29.7 PG (ref 26.5–33)
MCHC RBC AUTO-ENTMCNC: 34.1 G/DL (ref 31.5–36.5)
MCV RBC AUTO: 87 FL (ref 78–100)
MICROALBUMIN UR-MCNC: 13 MG/L
MICROALBUMIN/CREAT UR: 9.01 MG/G CR (ref 0–17)
MONOCYTES # BLD AUTO: 1.1 10E9/L (ref 0–1.3)
MONOCYTES NFR BLD AUTO: 8.3 %
NEUTROPHILS # BLD AUTO: 8.2 10E9/L (ref 1.6–8.3)
NEUTROPHILS NFR BLD AUTO: 63 %
PLATELET # BLD AUTO: 343 10E9/L (ref 150–450)
POTASSIUM SERPL-SCNC: 3.6 MMOL/L (ref 3.4–5.3)
PROT SERPL-MCNC: 7.9 G/DL (ref 6.8–8.8)
RBC # BLD AUTO: 5.32 10E12/L (ref 4.4–5.9)
SODIUM SERPL-SCNC: 138 MMOL/L (ref 133–144)
WBC # BLD AUTO: 13 10E9/L (ref 4–11)

## 2019-11-26 PROCEDURE — 90471 IMMUNIZATION ADMIN: CPT | Performed by: FAMILY MEDICINE

## 2019-11-26 PROCEDURE — 36415 COLL VENOUS BLD VENIPUNCTURE: CPT | Performed by: FAMILY MEDICINE

## 2019-11-26 PROCEDURE — 82043 UR ALBUMIN QUANTITATIVE: CPT | Performed by: FAMILY MEDICINE

## 2019-11-26 PROCEDURE — 90715 TDAP VACCINE 7 YRS/> IM: CPT | Performed by: FAMILY MEDICINE

## 2019-11-26 PROCEDURE — 85025 COMPLETE CBC W/AUTO DIFF WBC: CPT | Performed by: FAMILY MEDICINE

## 2019-11-26 PROCEDURE — 99215 OFFICE O/P EST HI 40 MIN: CPT | Mod: 25 | Performed by: FAMILY MEDICINE

## 2019-11-26 PROCEDURE — 80053 COMPREHEN METABOLIC PANEL: CPT | Performed by: FAMILY MEDICINE

## 2019-11-26 RX ORDER — LOSARTAN POTASSIUM AND HYDROCHLOROTHIAZIDE 12.5; 5 MG/1; MG/1
1 TABLET ORAL DAILY
Qty: 90 TABLET | Refills: 1 | Status: SHIPPED | OUTPATIENT
Start: 2019-11-26 | End: 2020-12-15

## 2019-11-26 RX ORDER — AMLODIPINE BESYLATE 10 MG/1
10 TABLET ORAL DAILY
Qty: 90 TABLET | Refills: 1 | Status: SHIPPED | OUTPATIENT
Start: 2019-11-26 | End: 2020-11-13

## 2019-11-26 ASSESSMENT — MIFFLIN-ST. JEOR: SCORE: 1806.36

## 2019-11-26 ASSESSMENT — PAIN SCALES - GENERAL: PAINLEVEL: NO PAIN (0)

## 2019-11-26 NOTE — PROGRESS NOTES
[address] [address]  [phone]  Dept: [phone]    PRE-OP EVALUATION:  Today's date: 2019    Sin Murrieta Jr. (: 1980) presents for pre-operative evaluation assessment as requested by Dr. Tayo Giraldo.  He requires evaluation and anesthesia risk assessment prior to undergoing surgery/procedure for treatment of phimosis .    Proposed Surgery/ Procedure: Vasectomy and Circumcision  Date of Surgery/ Procedure: 19  Time of Surgery/ Procedure: 1:00 PM  Hospital/Surgical Facility: [facility]  Fax number for surgical facility: available electronically  Primary Physician: Jose Holden  Type of Anesthesia Anticipated: MAC    Patient has a Health Care Directive or Living Will:  NO    1. NO - Do you have a history of heart attack, stroke, stent, bypass or surgery on an artery in the head, neck, heart or legs?  2. NO - Do you ever have any pain or discomfort in your chest?  3. NO - Do you have a history of  Heart Failure?  4. NO - Are you troubled by shortness of breath when: walking on the level, up a slight hill or at night?  5. YES - DO YOU CURRENTLY HAVE A COLD, BRONCHITIS OR OTHER RESPIRATORY INFECTION?  Patient is recovering from a upper respiratory infection that started 5 days ago, is feeling much better with resolving cough and no current concerns for runny nose, nasal congestion, sore throat  6. NO - Do you have a cough, shortness of breath or wheezing?  7. NO - Do you sometimes get pains in the calves of your legs when you walk?  8. YES - DO YOU OR ANYONE IN YOUR FAMILY HAVE PREVIOUS HISTORY OF BLOOD CLOTS?  Father with history of stroke and DVT  9. NO - Do you or does anyone in your family have a serious bleeding problem such as prolonged bleeding following surgeries or cuts?  10. NO - Have you ever had problems with anemia or been told to take iron pills?  11. NO - Have you had any abnormal blood loss such as black,  tarry or bloody stools, or abnormal vaginal bleeding?  12. NO - Have you ever had a blood transfusion?  13. NO - Have you or any of your relatives ever had problems with anesthesia?  14. NO - Do you have sleep apnea, excessive snoring or daytime drowsiness?  15. NO - Do you have any prosthetic heart valves?  16. NO - Do you have prosthetic joints?  17. NO - Is there any chance that you may be pregnant?      HPI:     HPI related to upcoming procedure: Patient with past medical history significant for hypertension and phimosis is here for preop clearance for his upcoming circumcision and vasectomy  He also brought a copy of the lab results that was done at his work in July 2019 that showed elevated liver enzyme  Patient denies previous history of liver disease, current concerns for abdominal pain, nausea, vomiting, jaundice, skin itching.  He drinks alcohol 1-2 drinks over the weekends  Denies taking over-the-counter herbal medications or other hepatotoxic drugs        HYPERTENSION - Patient has longstanding history of HTN , currently denies any symptoms referable to elevated blood pressure. Specifically denies chest pain, palpitations, dyspnea, orthopnea, PND or peripheral edema. Blood pressure readings have been in normal range. Current medication regimen is as listed below. Patient denies any side effects of medication.       MEDICAL HISTORY:     Patient Active Problem List    Diagnosis Date Noted     Phimosis 10/21/2019     Priority: Medium     Added automatically from request for surgery [id number]       Encounter for vasectomy 10/21/2019     Priority: Medium     Added automatically from request for surgery [id number]       Leukocytosis, unspecified type 11/28/2018     Priority: Medium     Overweight (BMI 25.0-29.9) 01/10/2018     Priority: Medium     Common wart, hand 01/10/2018     Priority: Medium     CARDIOVASCULAR SCREENING; LDL GOAL LESS THAN 160 11/27/2017     Priority: Medium     Hypertension goal BP (blood  pressure) < 140/90 10/25/2017     Priority: Medium      Past Medical History:   Diagnosis Date     Hypertension      Past Surgical History:   Procedure Laterality Date     HERNIA REPAIR       Current Outpatient Medications   Medication Sig Dispense Refill     amLODIPine (NORVASC) 10 MG tablet Take 1 tablet (10 mg) by mouth daily 90 tablet 0     augmented betamethasone dipropionate (DIPROLENE-AF) 0.05 % external cream Apply topically 2 times daily 50 g 0     losartan-hydrochlorothiazide (HYZAAR) 50-12.5 MG tablet Take 1 tablet by mouth daily 90 tablet 0     OTC products: None, except as noted above    No Known Allergies   Latex Allergy: NO    Social History     Tobacco Use     Smoking status: Former Smoker     Smokeless tobacco: Never Used   Substance Use Topics     Alcohol use: Yes     Comment: weekends     History   Drug Use No       REVIEW OF SYSTEMS:   CONSTITUTIONAL: NEGATIVE for fever, chills, change in weight  INTEGUMENTARY/SKIN: NEGATIVE for worrisome rashes, moles or lesions  EYES: NEGATIVE for vision changes or irritation  ENT/MOUTH: History of runny nose and nasal congestion  RESP: Mild dry cough  BREAST: NEGATIVE for masses, tenderness or discharge  CV: NEGATIVE for chest pain, palpitations or peripheral edema  CV: History of hypertension  GI: NEGATIVE for nausea, abdominal pain, heartburn, or change in bowel habits  GI: History of elevated liver enzyme  : NEGATIVE for frequency, dysuria, or hematuria  MUSCULOSKELETAL: NEGATIVE for significant arthralgias or myalgia  NEURO: NEGATIVE for weakness, dizziness or paresthesias  ENDOCRINE: NEGATIVE for temperature intolerance, skin/hair changes  HEME: NEGATIVE for bleeding problems  PSYCHIATRIC: NEGATIVE for changes in mood or affect    EXAM:   There were no vitals taken for this visit.    GENERAL APPEARANCE: healthy, alert and no distress     EYES: EOMI,  PERRL     HENT: ear canals and TM's normal and nose and mouth without ulcers or lesions     NECK: no  adenopathy, no asymmetry, masses, or scars and thyroid normal to palpation     RESP: lungs clear to auscultation - no rales, rhonchi or wheezes     CV: regular rates and rhythm, normal S1 S2, no S3 or S4 and no murmur, click or rub     ABDOMEN:  soft, nontender, no HSM or masses and bowel sounds normal     MS: extremities normal- no gross deformities noted, no evidence of inflammation in joints, FROM in all extremities.     SKIN: no suspicious lesions or rashes     NEURO: Normal strength and tone, sensory exam grossly normal, mentation intact and speech normal     PSYCH: mentation appears normal. and affect normal/bright     LYMPHATICS: No cervical adenopathy    DIAGNOSTICS:     Results for orders placed or performed in visit on 11/26/19   CBC with platelets and differential     Status: Abnormal   Result Value Ref Range    WBC 13.0 (H) 4.0 - 11.0 10e9/L    RBC Count 5.32 4.4 - 5.9 10e12/L    Hemoglobin 15.8 13.3 - 17.7 g/dL    Hematocrit 46.4 40.0 - 53.0 %    MCV 87 78 - 100 fl    MCH 29.7 26.5 - 33.0 pg    MCHC 34.1 31.5 - 36.5 g/dL    RDW 12.5 10.0 - 15.0 %    Platelet Count 343 150 - 450 10e9/L    Diff Method Automated Method     % Neutrophils 63.0 %    % Lymphocytes 25.5 %    % Monocytes 8.3 %    % Eosinophils 1.9 %    % Basophils 0.5 %    % Immature Granulocytes 0.8 %    Absolute Neutrophil 8.2 1.6 - 8.3 10e9/L    Absolute Lymphocytes 3.3 0.8 - 5.3 10e9/L    Absolute Monocytes 1.1 0.0 - 1.3 10e9/L    Absolute Eosinophils 0.3 0.0 - 0.7 10e9/L    Absolute Basophils 0.1 0.0 - 0.2 10e9/L    Abs Immature Granulocytes 0.1 0 - 0.4 10e9/L   Comprehensive metabolic panel     Status: None   Result Value Ref Range    Sodium 138 133 - 144 mmol/L    Potassium 3.6 3.4 - 5.3 mmol/L    Chloride 107 94 - 109 mmol/L    Carbon Dioxide 28 20 - 32 mmol/L    Anion Gap 3 3 - 14 mmol/L    Glucose 92 70 - 99 mg/dL    Urea Nitrogen 16 7 - 30 mg/dL    Creatinine 0.81 0.66 - 1.25 mg/dL    GFR Estimate >90 >60 mL/min/[1.73_m2]    GFR  Estimate If Black >90 >60 mL/min/[1.73_m2]    Calcium 8.8 8.5 - 10.1 mg/dL    Bilirubin Total 0.3 0.2 - 1.3 mg/dL    Albumin 3.9 3.4 - 5.0 g/dL    Protein Total 7.9 6.8 - 8.8 g/dL    Alkaline Phosphatase 120 40 - 150 U/L    ALT 53 0 - 70 U/L    AST 20 0 - 45 U/L       T family  Recent Labs   Lab Test 01/11/19  0821 11/28/18  1802 07/30/18 01/10/18  0707   HGB  --  15.6  --  14.9   PLT  --  352  --  288     --   --  140   POTASSIUM 3.8  --  4.3 3.9   CR 0.89  --  0.78 0.97        IMPRESSION:   Reason for surgery/procedure: Phimosis and family planning/circumcision and vasectomy  Diagnosis/reason for consult: Preoperative clearance    ASSESSMENT / PLAN:  (Z01.818) Preop general physical exam  (primary encounter diagnosis)  Comment:   Plan: CBC with platelets and differential,         Comprehensive metabolic panel, CANCELED: Basic         metabolic panel  (Ca, Cl, CO2, Creat, Gluc, K,         Na, BUN)        Patient is cleared for the procedure  Recommended to hold losartan and hydrochlorothiazide on the morning of the procedure    (N47.1) Phimosis  Comment:   Plan: CBC with platelets and differential,         Comprehensive metabolic panel, CANCELED: Basic         metabolic panel  (Ca, Cl, CO2, Creat, Gluc, K,         Na, BUN)        as above      (Z30.2) Encounter for vasectomy  Comment:   Plan: CBC with platelets and differential,         Comprehensive metabolic panel, CANCELED: Basic         metabolic panel  (Ca, Cl, CO2, Creat, Gluc, K,         Na, BUN)        as above      (I10) Hypertension goal BP (blood pressure) < 140/90  Comment:   Plan: amLODIPine (NORVASC) 10 MG tablet,         Comprehensive metabolic panel, Albumin Random         Urine Quantitative with Creat Ratio,         losartan-hydrochlorothiazide (HYZAAR) 50-12.5         MG tablet          BP Readings from Last 6 Encounters:   11/26/19 116/80   10/01/19 (!) 148/98   04/23/19 131/76   01/11/19 133/89   11/28/18 (!) 143/97   01/10/18 134/85      Blood pressure is at goal, continue with current medications  Will follow low salt diet, weight loss and regular exercises.  Recheck in June at the time of physical or sooner if needed    (R74.0) Elevated ALT measurement  Comment:   Plan: Comprehensive metabolic panel        Liver Function Studies -   Recent Labs   Lab Test 11/26/19  1639   PROTTOTAL 7.9   ALBUMIN 3.9   BILITOTAL 0.3   ALKPHOS 120   AST 20   ALT 53     Reviewed the copy of lab results that patient brought from work that showed ALT that was slightly elevated labs were extracted in [facility]  Lab recheck done today that was normal    (J06.9) Upper respiratory tract infection, unspecified type  Comment:   Plan: Resolving, monitor for now        The proposed surgical procedure is considered INTERMEDIATE risk.    REVISED CARDIAC RISK INDEX  The patient has the following serious cardiovascular risks for perioperative complications such as (MI, PE, VFib and 3  AV Block):  No serious cardiac risks  INTERPRETATION: 0 risks: Class I (very low risk - 0.4% complication rate)    The patient has the following additional risks for perioperative complications:  No identified additional risks      ICD-10-CM    1. Preop general physical exam Z01.818        RECOMMENDATIONS:         --Patient is to take all scheduled medications on the day of surgery EXCEPT for modifications listed below.    ACE Inhibitor or Angiotensin Receptor Blocker (ARB) Use  Ace inhibitor or Angiotensin Receptor Blocker (ARB) and should HOLD this medication for the 24 hours prior to surgery.      APPROVAL GIVEN to proceed with proposed procedure, without further diagnostic evaluation       Signed Electronically by: Jose Holden MD    Copy of this evaluation report is provided to requesting physician.    [facility] Preop Guidelines    Revised Cardiac Risk Index  Chart documentation done in part with Dragon Voice recognition Software. Although reviewed after completion, some word and  grammatical error may remain.

## 2019-11-26 NOTE — PATIENT INSTRUCTIONS
Get the labs today  Get the TDAP shot today  Hold HYZAAR on the morning of surgery  Schedule for physical in 6/2020      Before Your Surgery    Call your surgeon if there is any change in your health. This includes signs of a cold or flu (such as a sore throat, runny nose, cough, rash or fever).    Do not smoke, drink alcohol or take over the counter medicine (unless your surgeon or primary care doctor tells you to) for the 24 hours before and after surgery.    If you take prescribed drugs: Follow your doctor s orders about which medicines to take and which to stop until after surgery.    Eating and drinking prior to surgery: follow the instructions from your surgeon    Take a shower or bath the night before surgery. Use the soap your surgeon gave you to gently clean your skin. If you do not have soap from your surgeon, use your regular soap. Do not shave or scrub the surgery site.  Wear clean pajamas and have clean sheets on your bed.

## 2019-11-27 NOTE — RESULT ENCOUNTER NOTE
Maximus Vogt,  Your lab tests showed normal urine exam with no protein leak, normal hemoglobin and potassium and kidney functions. The liver enzymes are also in the normal range, these are reassuring.  The white counts are still slightly elevated, but other subsets including platelets are all in normal range, this is good and less concerning. As we reviewed, given your recent cold symptoms,this may not indicate true abnormal finding. I would repeat the labs at your visit during the physical next year.   Let me know if you have any questions. Take care.  Jose Holden MD

## 2019-12-13 ENCOUNTER — ANESTHESIA EVENT (OUTPATIENT)
Dept: SURGERY | Facility: AMBULATORY SURGERY CENTER | Age: 39
End: 2019-12-13

## 2019-12-13 RX ORDER — FLUOROURACIL 50 MG/G
CREAM TOPICAL
COMMUNITY
Start: 2019-09-04 | End: 2019-12-16 | Stop reason: HOSPADM

## 2019-12-13 RX ORDER — MEPERIDINE HYDROCHLORIDE 25 MG/ML
12.5 INJECTION INTRAMUSCULAR; INTRAVENOUS; SUBCUTANEOUS
Status: CANCELLED | OUTPATIENT
Start: 2019-12-13

## 2019-12-13 RX ORDER — SODIUM CHLORIDE, SODIUM LACTATE, POTASSIUM CHLORIDE, CALCIUM CHLORIDE 600; 310; 30; 20 MG/100ML; MG/100ML; MG/100ML; MG/100ML
INJECTION, SOLUTION INTRAVENOUS CONTINUOUS
Status: CANCELLED | OUTPATIENT
Start: 2019-12-13

## 2019-12-13 RX ORDER — LIDOCAINE 40 MG/G
CREAM TOPICAL
Status: CANCELLED | OUTPATIENT
Start: 2019-12-13

## 2019-12-13 RX ORDER — ONDANSETRON 2 MG/ML
4 INJECTION INTRAMUSCULAR; INTRAVENOUS EVERY 30 MIN PRN
Status: CANCELLED | OUTPATIENT
Start: 2019-12-13

## 2019-12-13 RX ORDER — ONDANSETRON 4 MG/1
4 TABLET, ORALLY DISINTEGRATING ORAL EVERY 30 MIN PRN
Status: CANCELLED | OUTPATIENT
Start: 2019-12-13

## 2019-12-13 RX ORDER — OXYCODONE HYDROCHLORIDE 5 MG/1
5 TABLET ORAL EVERY 4 HOURS PRN
Status: CANCELLED | OUTPATIENT
Start: 2019-12-13

## 2019-12-13 RX ORDER — NALOXONE HYDROCHLORIDE 0.4 MG/ML
.1-.4 INJECTION, SOLUTION INTRAMUSCULAR; INTRAVENOUS; SUBCUTANEOUS
Status: CANCELLED | OUTPATIENT
Start: 2019-12-13 | End: 2019-12-14

## 2019-12-13 RX ORDER — ACETAMINOPHEN 325 MG/1
975 TABLET ORAL ONCE
Status: CANCELLED | OUTPATIENT
Start: 2019-12-13 | End: 2019-12-13

## 2019-12-13 RX ORDER — FENTANYL CITRATE 50 UG/ML
25-50 INJECTION, SOLUTION INTRAMUSCULAR; INTRAVENOUS
Status: CANCELLED | OUTPATIENT
Start: 2019-12-13

## 2019-12-16 ENCOUNTER — ANESTHESIA (OUTPATIENT)
Dept: SURGERY | Facility: AMBULATORY SURGERY CENTER | Age: 39
End: 2019-12-16

## 2019-12-16 ENCOUNTER — TELEPHONE (OUTPATIENT)
Dept: UROLOGY | Facility: CLINIC | Age: 39
End: 2019-12-16

## 2019-12-16 NOTE — TELEPHONE ENCOUNTER
Reason for Call:  Other call back    Detailed comments: Patient called in to cancel his surgery for today 12/16/2019 due to a sick child. He is wanting to reschedule just the vasectomy only for a in clinic procedure. Please call and schedule Thank you .     Phone Number Patient can be reached at: Home number on file [phone] (home)    Best Time: any    Can we leave a detailed message on this number? YES    Call taken on 12/16/2019 at 10:10 AM by Amanda Wheat

## 2019-12-16 NOTE — TELEPHONE ENCOUNTER
Patient vasectomy scheduled 1/31/2020 at 9:00am. Patient advised to arrive at 8:45 am, stop blood thinners 1 week prior, and shave. Patient agrees.   Toña Antunez CMA

## 2020-01-31 ENCOUNTER — OFFICE VISIT (OUTPATIENT)
Dept: UROLOGY | Facility: CLINIC | Age: 40
End: 2020-01-31
Payer: COMMERCIAL

## 2020-01-31 DIAGNOSIS — Z30.2 ENCOUNTER FOR VASECTOMY: Primary | ICD-10-CM

## 2020-01-31 PROCEDURE — 55250 REMOVAL OF SPERM DUCT(S): CPT | Performed by: UROLOGY

## 2020-01-31 PROCEDURE — 88302 TISSUE EXAM BY PATHOLOGIST: CPT | Performed by: UROLOGY

## 2020-02-05 LAB — COPATH REPORT: NORMAL

## 2020-03-11 ENCOUNTER — MYC REFILL (OUTPATIENT)
Dept: PEDIATRICS | Facility: CLINIC | Age: 40
End: 2020-03-11

## 2020-03-11 ENCOUNTER — HEALTH MAINTENANCE LETTER (OUTPATIENT)
Age: 40
End: 2020-03-11

## 2020-03-11 DIAGNOSIS — I10 HYPERTENSION GOAL BP (BLOOD PRESSURE) < 140/90: ICD-10-CM

## 2020-03-11 RX ORDER — AMLODIPINE BESYLATE 10 MG/1
10 TABLET ORAL DAILY
Qty: 90 TABLET | Refills: 1 | Status: CANCELLED | OUTPATIENT
Start: 2020-03-11

## 2020-03-11 NOTE — TELEPHONE ENCOUNTER
LOV- 11/26 says 6 months for physical. Should have enough until then. Sent MyChart.  Viky Oliva RN

## 2020-11-06 ENCOUNTER — TRANSFERRED RECORDS (OUTPATIENT)
Dept: HEALTH INFORMATION MANAGEMENT | Facility: CLINIC | Age: 40
End: 2020-11-06

## 2020-11-06 LAB
ALT SERPL-CCNC: 37 U/L (ref 9–46)
AST SERPL-CCNC: 20 U/L (ref 10–40)
CHOLEST SERPL-MCNC: 170 MG/DL
CREAT SERPL-MCNC: 0.89 MG/DL (ref 0.6–1.35)
GFR SERPL CREATININE-BSD FRML MDRD: 108 ML/MIN/1.73M2
GLUCOSE SERPL-MCNC: 93 MG/DL (ref 65–99)
HDLC SERPL-MCNC: 43 MG/DL
NONHDLC SERPL-MCNC: 127 MG/DL
POTASSIUM SERPL-SCNC: 5.1 MMOL/L (ref 3.5–5.3)

## 2020-12-09 DIAGNOSIS — Z13.0 SCREENING FOR DEFICIENCY ANEMIA: Primary | ICD-10-CM

## 2020-12-09 DIAGNOSIS — Z13.1 SCREENING FOR DIABETES MELLITUS (DM): ICD-10-CM

## 2020-12-09 DIAGNOSIS — I10 HYPERTENSION GOAL BP (BLOOD PRESSURE) < 140/90: ICD-10-CM

## 2020-12-09 DIAGNOSIS — Z13.6 CARDIOVASCULAR SCREENING; LDL GOAL LESS THAN 160: ICD-10-CM

## 2020-12-15 ENCOUNTER — OFFICE VISIT (OUTPATIENT)
Dept: PEDIATRICS | Facility: CLINIC | Age: 40
End: 2020-12-15
Payer: COMMERCIAL

## 2020-12-15 VITALS
TEMPERATURE: 98.2 F | SYSTOLIC BLOOD PRESSURE: 130 MMHG | DIASTOLIC BLOOD PRESSURE: 88 MMHG | HEART RATE: 80 BPM | BODY MASS INDEX: 29.16 KG/M2 | OXYGEN SATURATION: 98 % | WEIGHT: 196.9 LBS | HEIGHT: 69 IN

## 2020-12-15 DIAGNOSIS — R79.89 LOW TESTOSTERONE IN MALE: ICD-10-CM

## 2020-12-15 DIAGNOSIS — Z00.00 ROUTINE GENERAL MEDICAL EXAMINATION AT A HEALTH CARE FACILITY: Primary | ICD-10-CM

## 2020-12-15 DIAGNOSIS — D72.829 LEUKOCYTOSIS, UNSPECIFIED TYPE: ICD-10-CM

## 2020-12-15 DIAGNOSIS — L20.89 OTHER ATOPIC DERMATITIS: ICD-10-CM

## 2020-12-15 DIAGNOSIS — I10 HYPERTENSION GOAL BP (BLOOD PRESSURE) < 140/90: ICD-10-CM

## 2020-12-15 DIAGNOSIS — E66.3 OVERWEIGHT (BMI 25.0-29.9): ICD-10-CM

## 2020-12-15 DIAGNOSIS — Z13.6 CARDIOVASCULAR SCREENING; LDL GOAL LESS THAN 160: ICD-10-CM

## 2020-12-15 PROCEDURE — 36415 COLL VENOUS BLD VENIPUNCTURE: CPT | Performed by: FAMILY MEDICINE

## 2020-12-15 PROCEDURE — 84270 ASSAY OF SEX HORMONE GLOBUL: CPT | Performed by: FAMILY MEDICINE

## 2020-12-15 PROCEDURE — 99395 PREV VISIT EST AGE 18-39: CPT | Performed by: FAMILY MEDICINE

## 2020-12-15 PROCEDURE — 84403 ASSAY OF TOTAL TESTOSTERONE: CPT | Mod: 90 | Performed by: FAMILY MEDICINE

## 2020-12-15 PROCEDURE — 99000 SPECIMEN HANDLING OFFICE-LAB: CPT | Performed by: FAMILY MEDICINE

## 2020-12-15 RX ORDER — LOSARTAN POTASSIUM AND HYDROCHLOROTHIAZIDE 12.5; 5 MG/1; MG/1
1 TABLET ORAL DAILY
Qty: 90 TABLET | Refills: 3 | Status: SHIPPED | OUTPATIENT
Start: 2020-12-15 | End: 2022-07-11

## 2020-12-15 RX ORDER — AMLODIPINE BESYLATE 10 MG/1
10 TABLET ORAL DAILY
Qty: 90 TABLET | Refills: 3 | Status: SHIPPED | OUTPATIENT
Start: 2020-12-15 | End: 2022-07-11

## 2020-12-15 RX ORDER — BETAMETHASONE DIPROPIONATE 0.5 MG/G
CREAM TOPICAL 2 TIMES DAILY
Qty: 50 G | Refills: 0 | Status: SHIPPED | OUTPATIENT
Start: 2020-12-15 | End: 2022-09-07

## 2020-12-15 ASSESSMENT — MIFFLIN-ST. JEOR: SCORE: 1799.54

## 2020-12-15 NOTE — PATIENT INSTRUCTIONS
Get the labs today    Preventive Health Recommendations  Male Ages 26 - 39    Yearly exam:             See your health care provider every year in order to  o   Review health changes.   o   Discuss preventive care.    o   Review your medicines if your doctor has prescribed any.    You should be tested each year for STDs (sexually transmitted diseases), if you re at risk.     After age 35, talk to your provider about cholesterol testing. If you are at risk for heart disease, have your cholesterol tested at least every 5 years.     If you are at risk for diabetes, you should have a diabetes test (fasting glucose).  Shots: Get a flu shot each year. Get a tetanus shot every 10 years.     Nutrition:    Eat at least 5 servings of fruits and vegetables daily.     Eat whole-grain bread, whole-wheat pasta and brown rice instead of white grains and rice.     Get adequate Calcium and Vitamin D.     Lifestyle    Exercise for at least 150 minutes a week (30 minutes a day, 5 days a week). This will help you control your weight and prevent disease.     Limit alcohol to one drink per day.     No smoking.     Wear sunscreen to prevent skin cancer.     See your dentist every six months for an exam and cleaning.

## 2020-12-15 NOTE — PROGRESS NOTES
3  SUBJECTIVE:   CC: Sin Murrieta Jr. is an 40 year old male who presents for preventive health visit.       Patient has been advised of split billing requirements and indicates understanding: Yes  Healthy Habits:    Do you get at least three servings of calcium containing foods daily (dairy, green leafy vegetables, etc.)? yes    Amount of exercise or daily activities, outside of work: not right now    Problems taking medications regularly No    Medication side effects: No    Have you had an eye exam in the past two years? no    Do you see a dentist twice per year? no    Do you have sleep apnea, excessive snoring or daytime drowsiness?no      Hypertension Follow-up      Do you check your blood pressure regularly outside of the clinic? No     Are you following a low salt diet? Yes    Are your blood pressures ever more than 140 on the top number (systolic) OR more   than 90 on the bottom number (diastolic), for example 140/90? n/a    ABNORMAL LABS-patient has brought a list of lab results that he had at [facility] last month from his work that showed normal WBC, normal fasting cholesterol numbers, normal fasting blood sugar, kidney functions and a low total testosterone at 233.  Patient denies concerns for fatigue, erectile dysfunction, feeling weakness, sleep problems, snoring  He is questioning if he needs any treatment for the low testosterone    Today's PHQ-2 Score:   PHQ-2 ( 1999 [employer]) 12/16/2020 11/26/2019   Q1: Little interest or pleasure in doing things 0 0   Q2: Feeling down, depressed or hopeless 0 0   PHQ-2 Score 0 0       Abuse: Current or Past(Physical, Sexual or Emotional)- No  Do you feel safe in your environment? Yes        Social History     Tobacco Use     Smoking status: Former Smoker     Quit date: 1/1/2005     Years since quitting: 15.9     Smokeless tobacco: Never Used   Substance Use Topics     Alcohol use: Yes     Comment: weekends     If you drink alcohol do you typically have >3 drinks per  day or >7 drinks per week? No                      Last PSA: No results found for: PSA    Reviewed orders with patient. Reviewed health maintenance and updated orders accordingly - Yes  Lab work is in process  Labs reviewed in EPIC  BP Readings from Last 3 Encounters:   12/15/20 130/88   11/26/19 116/80   10/01/19 (!) 148/98    Wt Readings from Last 3 Encounters:   12/15/20 89.3 kg (196 lb 14.4 oz)   11/26/19 89.5 kg (197 lb 4.8 oz)   04/23/19 86.9 kg (191 lb 9.6 oz)                  Patient Active Problem List   Diagnosis     Hypertension goal BP (blood pressure) < 140/90     CARDIOVASCULAR SCREENING; LDL GOAL LESS THAN 160     Overweight (BMI 25.0-29.9)     Common wart, hand     Leukocytosis, unspecified type     Phimosis     Encounter for vasectomy     Elevated ALT measurement     Past Surgical History:   Procedure Laterality Date     HERNIA REPAIR         Social History     Tobacco Use     Smoking status: Former Smoker     Quit date: 1/1/2005     Years since quitting: 15.9     Smokeless tobacco: Never Used   Substance Use Topics     Alcohol use: Yes     Comment: weekends     Family History   Problem Relation Age of Onset     Cerebrovascular Disease Father      Hepatitis Brother          Current Outpatient Medications   Medication Sig Dispense Refill     amLODIPine (NORVASC) 10 MG tablet Take 1 tablet (10 mg) by mouth daily 90 tablet 3     augmented betamethasone dipropionate (DIPROLENE-AF) 0.05 % external cream Apply topically 2 times daily 50 g 0     losartan-hydrochlorothiazide (HYZAAR) 50-12.5 MG tablet Take 1 tablet by mouth daily 90 tablet 3     No Known Allergies  Recent Labs   Lab Test 11/26/19  1639 01/11/19  0821 07/30/18 01/10/18  0707   LDL  --   --  111* 69   HDL  --   --  49 41   TRIG  --   --  144 235*   ALT 53  --  34 46   CR 0.81 0.89 0.78 0.97   GFRESTIMATED >90 >90 116 87   GFRESTBLACK >90 >90 134 >90   POTASSIUM 3.6 3.8 4.3 3.9   TSH  --   --   --  1.88        Reviewed and updated as needed  "this visit by clinical staff  Tobacco  Allergies  Meds   Med Hx  Surg Hx  Fam Hx  Soc Hx        Reviewed and updated as needed this visit by Provider                  Past Medical History:   Diagnosis Date     Hypertension       Past Surgical History:   Procedure Laterality Date     HERNIA REPAIR       OB History   No obstetric history on file.       ROS:  CONSTITUTIONAL: NEGATIVE for fever, chills, change in weight  INTEGUMENTARY/SKIN: NEGATIVE for worrisome rashes, moles or lesions  EYES: NEGATIVE for vision changes or irritation  ENT: NEGATIVE for ear, mouth and throat problems  RESP: NEGATIVE for significant cough or SOB  CV: NEGATIVE for chest pain, palpitations or peripheral edema  CV: Hx HTN  GI: NEGATIVE for nausea, abdominal pain, heartburn, or change in bowel habits   male: negative for dysuria, hematuria, decreased urinary stream, erectile dysfunction, urethral discharge  MUSCULOSKELETAL: NEGATIVE for significant arthralgias or myalgia  NEURO: NEGATIVE for weakness, dizziness or paresthesias  ENDOCRINE: NEGATIVE for temperature intolerance, skin/hair changes  HEME/ALLERGY/IMMUNE: NEGATIVE for bleeding problems  PSYCHIATRIC: NEGATIVE for changes in mood or affect    OBJECTIVE:   /88   Pulse 80   Temp 98.2  F (36.8  C) (Temporal)   Ht 1.746 m (5' 8.75\")   Wt 89.3 kg (196 lb 14.4 oz)   SpO2 98%   BMI 29.29 kg/m    EXAM:  GENERAL: healthy, alert and no distress  EYES: Eyes grossly normal to inspection, PERRL and conjunctivae and sclerae normal  HENT: ear canals and TM's normal, nose and mouth without ulcers or lesions  NECK: no adenopathy, no asymmetry, masses, or scars and thyroid normal to palpation  RESP: lungs clear to auscultation - no rales, rhonchi or wheezes  CV: regular rate and rhythm, normal S1 S2, no S3 or S4, no murmur, click or rub, no peripheral edema and peripheral pulses strong  ABDOMEN: soft, nontender, no hepatosplenomegaly, no masses and bowel sounds normal  MS: no " gross musculoskeletal defects noted, no edema  SKIN: Erythematous, dry, patch of eczema about 3 x 3 cm in size on the mid right lateral leg  NEURO: Normal strength and tone, mentation intact and speech normal  PSYCH: mentation appears normal, affect normal/bright    Diagnostic Test Results:  Labs reviewed in Epic    ASSESSMENT/PLAN:   1. Routine general medical examination at a health care facility  : Discussed on regular exercises, healthy eating, self testicular exams  and routine dental checks.      2. Hypertension goal BP (blood pressure) < 140/90  BP Readings from Last 6 Encounters:   12/15/20 130/88   11/26/19 116/80   10/01/19 (!) 148/98   04/23/19 131/76   01/11/19 133/89   11/28/18 (!) 143/97     Blood pressure is at goal, continue with current medications, reviewed normal CBC, BMP lab results from [facility] that patient brought today  Will follow low salt diet, weight loss and regular exercises.  Will check microalbumin today  - amLODIPine (NORVASC) 10 MG tablet; Take 1 tablet (10 mg) by mouth daily  Dispense: 90 tablet; Refill: 3  - losartan-hydrochlorothiazide (HYZAAR) 50-12.5 MG tablet; Take 1 tablet by mouth daily  Dispense: 90 tablet; Refill: 3    3. Low testosterone in male  Reviewed total low testosterone of 233 from last month, but patient has brought the results for today  .  Recommended patient to have both free and total testosterone  If low, will consider testosterone replacement therapy  Will f/u on results and call with recommendations.  Patient verbalised understanding and is agreeable to the plan.    - Testosterone Free and Total    4. Leukocytosis, unspecified type  Reviewed normal CBC and a WBC , continue to monitor    5. Overweight (BMI 25.0-29.9)  Wt Readings from Last 5 Encounters:   12/15/20 89.3 kg (196 lb 14.4 oz)   11/26/19 89.5 kg (197 lb 4.8 oz)   04/23/19 86.9 kg (191 lb 9.6 oz)   11/28/18 89.4 kg (197 lb)   01/10/18 86.7 kg (191 lb 1.6 oz)     Emphasized on weight loss, portion  "control, low calorie and low fat diet, healthy eating, regular exercises.      6. CARDIOVASCULAR SCREENING; LDL GOAL LESS THAN 160  Reviewed normal fasting cholesterol numbers    7. Other atopic dermatitis  On the right leg  Recommended to use topical Diprolene cream twice a day for up to 2 weeks, emphasized on liberal use of moisturizers twice a day  - augmented betamethasone dipropionate (DIPROLENE-AF) 0.05 % external cream; Apply topically 2 times daily  Dispense: 50 g; Refill: 0    Patient has been advised of split billing requirements and indicates understanding: Yes  COUNSELING:  Reviewed preventive health counseling, as reflected in patient instructions  Special attention given to:        Regular exercise       Healthy diet/nutrition       Vision screening    Estimated body mass index is 29.29 kg/m  as calculated from the following:    Height as of this encounter: 1.746 m (5' 8.75\").    Weight as of this encounter: 89.3 kg (196 lb 14.4 oz).    Weight management plan: Discussed healthy diet and exercise guidelines    He reports that he quit smoking about 15 years ago. He has never used smokeless tobacco.      Counseling Resources:  ATP IV Guidelines  Pooled Cohorts Equation Calculator  FRAX Risk Assessment  ICSI Preventive Guidelines  Dietary Guidelines for Americans, 2010  USDA's MyPlate  ASA Prophylaxis  Lung CA Screening    Jose Holden MD  [facility]  Chart documentation done in part with Dragon Voice recognition Software. Although reviewed after completion, some word and grammatical error may remain.    "

## 2020-12-16 DIAGNOSIS — I10 HYPERTENSION GOAL BP (BLOOD PRESSURE) < 140/90: ICD-10-CM

## 2020-12-16 LAB
CREAT UR-MCNC: 154 MG/DL
MICROALBUMIN UR-MCNC: 30 MG/L
MICROALBUMIN/CREAT UR: 19.74 MG/G CR (ref 0–17)

## 2020-12-16 PROCEDURE — 82043 UR ALBUMIN QUANTITATIVE: CPT | Performed by: FAMILY MEDICINE

## 2020-12-19 LAB
SHBG SERPL-SCNC: 16 NMOL/L (ref 11–80)
TESTOST FREE SERPL-MCNC: 7.32 NG/DL (ref 4.7–24.4)
TESTOST SERPL-MCNC: 265 NG/DL (ref 240–950)

## 2020-12-21 NOTE — RESULT ENCOUNTER NOTE
Maximus Vogt,  Testosterone levels are in the low normal range, that does not need any replacement therapy at this time.  Continue with your efforts on healthy eating.  Losing weight and avoiding or limiting alcohol and increasing the level of exercises , following a good sleep hygiene will improve the testosterone naturally.   Let me know if you have any questions. Take care.  Jose Holden MD

## 2021-03-11 ENCOUNTER — MYC MEDICAL ADVICE (OUTPATIENT)
Dept: FAMILY MEDICINE | Facility: CLINIC | Age: 41
End: 2021-03-11

## 2021-10-09 ENCOUNTER — HEALTH MAINTENANCE LETTER (OUTPATIENT)
Age: 41
End: 2021-10-09

## 2021-11-12 NOTE — TELEPHONE ENCOUNTER
Patient called to cancel this surgery due to a sick child, Patient has left a message for Dr. Orlando to reschedule the Vasectomy for a in clinic procedure.    Leukocytosis Leukocytosis Leukocytosis Mesenteric ischemia Mesenteric ischemia

## 2022-01-29 ENCOUNTER — HEALTH MAINTENANCE LETTER (OUTPATIENT)
Age: 42
End: 2022-01-29

## 2022-07-06 DIAGNOSIS — I10 HYPERTENSION GOAL BP (BLOOD PRESSURE) < 140/90: ICD-10-CM

## 2022-07-06 DIAGNOSIS — Z13.220 SCREENING FOR HYPERLIPIDEMIA: ICD-10-CM

## 2022-07-06 DIAGNOSIS — Z13.0 SCREENING FOR DEFICIENCY ANEMIA: Primary | ICD-10-CM

## 2022-07-06 DIAGNOSIS — Z13.1 SCREENING FOR DIABETES MELLITUS (DM): ICD-10-CM

## 2022-07-07 RX ORDER — AMLODIPINE BESYLATE 10 MG/1
TABLET ORAL
Qty: 90 TABLET | Refills: 0 | OUTPATIENT
Start: 2022-07-07

## 2022-07-07 RX ORDER — LOSARTAN POTASSIUM AND HYDROCHLOROTHIAZIDE 12.5; 5 MG/1; MG/1
TABLET ORAL
Qty: 90 TABLET | Refills: 0 | OUTPATIENT
Start: 2022-07-07

## 2022-07-07 NOTE — TELEPHONE ENCOUNTER
I have not seen patient since December 2020  He is due for fasting labs and med recheck  I see that he is currently scheduled for November  I would recommend to be seen sooner for the blood pressure and fasting labs-please schedule in the next 1 to 2 weeks.me  at any available same-day/new patient/hospital follow-up slots, along with a fasting labs 1 to 2 days before the visit  Once appointments are scheduled, route them back to me so we can approve short-term refills and to the visit

## 2022-07-07 NOTE — TELEPHONE ENCOUNTER
please call pt and read them message from Dr. Holden and help them schedule the appointment's per the providers instructions.  Rebecca HAJIN, RN

## 2022-07-07 NOTE — TELEPHONE ENCOUNTER
"Appointments in Next Year      Jan 06, 2023  7:40 AM  (Arrive by 7:20 AM)  PHYSICAL with Jose Holden MD  [facility] ([facility] ) [phone]          Requested Prescriptions   Pending Prescriptions Disp Refills    losartan-hydrochlorothiazide (HYZAAR) 50-12.5 MG tablet [Pharmacy Med Name: Losartan Potassium-HCTZ 50-12.5 MG Oral Tablet] 90 tablet 0     Sig: Take 1 tablet by mouth once daily        Angiotensin-II Receptors Failed - 7/6/2022  6:03 PM        Failed - Last blood pressure under 140/90 in past 12 months       BP Readings from Last 3 Encounters:   12/15/20 130/88   11/26/19 116/80   10/01/19 (!) 148/98                 Failed - Recent (12 mo) or future (30 days) visit within the authorizing provider's specialty     Patient has had an office visit with the authorizing provider or a provider within the authorizing providers department within the previous 12 mos or has a future within next 30 days. See \"Patient Info\" tab in inbasket, or \"Choose Columns\" in Meds & Orders section of the refill encounter.              Failed - Normal serum creatinine on file in past 12 months     Recent Labs   Lab Test 11/06/20  0000   CR 0.89       Ok to refill medication if creatinine is low          Failed - Normal serum potassium on file in past 12 months       Recent Labs   Lab Test 11/06/20  0000   POTASSIUM 5.1                    Passed - Medication is active on med list        Passed - Patient is age 18 or older       Diuretics (Including Combos) Protocol Failed - 7/6/2022  6:03 PM        Failed - Blood pressure under 140/90 in past 12 months       BP Readings from Last 3 Encounters:   12/15/20 130/88   11/26/19 116/80   10/01/19 (!) 148/98                 Failed - Recent (12 mo) or future (30 days) visit within the authorizing provider's specialty     Patient has had an office visit with the authorizing provider or a provider within the authorizing providers " "department within the previous 12 mos or has a future within next 30 days. See \"Patient Info\" tab in inbasket, or \"Choose Columns\" in Meds & Orders section of the refill encounter.              Failed - Normal serum creatinine on file in past 12 months       Recent Labs   Lab Test 11/06/20  0000   CR 0.89              Failed - Normal serum potassium on file in past 12 months       Recent Labs   Lab Test 11/06/20  0000   POTASSIUM 5.1                    Failed - Normal serum sodium on file in past 12 months       Recent Labs   Lab Test 11/26/19  1639                 Passed - Medication is active on med list        Passed - Patient is age 18 or older           amLODIPine (NORVASC) 10 MG tablet [Pharmacy Med Name: amLODIPine Besylate 10 MG Oral Tablet] 90 tablet 0     Sig: Take 1 tablet by mouth once daily        Calcium Channel Blockers Protocol  Failed - 7/6/2022  6:03 PM        Failed - Blood pressure under 140/90 in past 12 months       BP Readings from Last 3 Encounters:   12/15/20 130/88   11/26/19 116/80   10/01/19 (!) 148/98                 Failed - Recent (12 mo) or future (30 days) visit within the authorizing provider's specialty     Patient has had an office visit with the authorizing provider or a provider within the authorizing providers department within the previous 12 mos or has a future within next 30 days. See \"Patient Info\" tab in inbasket, or \"Choose Columns\" in Meds & Orders section of the refill encounter.              Failed - Normal serum creatinine on file in past 12 months     Recent Labs   Lab Test 11/06/20  0000   CR 0.89       Ok to refill medication if creatinine is low          Passed - Medication is active on med list        Passed - Patient is age 18 or older              "

## 2022-07-11 RX ORDER — AMLODIPINE BESYLATE 10 MG/1
10 TABLET ORAL DAILY
Qty: 90 TABLET | Refills: 3 | Status: SHIPPED | OUTPATIENT
Start: 2022-07-11 | End: 2022-09-07

## 2022-07-11 RX ORDER — LOSARTAN POTASSIUM AND HYDROCHLOROTHIAZIDE 12.5; 5 MG/1; MG/1
1 TABLET ORAL DAILY
Qty: 90 TABLET | Refills: 0 | Status: SHIPPED | OUTPATIENT
Start: 2022-07-11 | End: 2022-09-07

## 2022-07-11 NOTE — TELEPHONE ENCOUNTER
Called pt and assisted with scheduling appointment with PCP. Pt is scheduled for 9/7/22 as that is Dr. Navin hawley in person same-day appointment.     Pt stated he is out of his medication and needs a refill to get him to his appointment date.     Thank you.

## 2022-09-02 ENCOUNTER — LAB (OUTPATIENT)
Dept: LAB | Facility: CLINIC | Age: 42
End: 2022-09-02
Payer: COMMERCIAL

## 2022-09-02 DIAGNOSIS — Z13.0 SCREENING FOR DEFICIENCY ANEMIA: ICD-10-CM

## 2022-09-02 DIAGNOSIS — Z13.220 SCREENING FOR HYPERLIPIDEMIA: ICD-10-CM

## 2022-09-02 DIAGNOSIS — Z13.1 SCREENING FOR DIABETES MELLITUS (DM): ICD-10-CM

## 2022-09-02 DIAGNOSIS — I10 HYPERTENSION GOAL BP (BLOOD PRESSURE) < 140/90: ICD-10-CM

## 2022-09-02 LAB
ALBUMIN SERPL-MCNC: 3.8 G/DL (ref 3.4–5)
ALP SERPL-CCNC: 95 U/L (ref 40–150)
ALT SERPL W P-5'-P-CCNC: 46 U/L (ref 0–70)
ANION GAP SERPL CALCULATED.3IONS-SCNC: 6 MMOL/L (ref 3–14)
AST SERPL W P-5'-P-CCNC: 22 U/L (ref 0–45)
BILIRUB SERPL-MCNC: 0.5 MG/DL (ref 0.2–1.3)
BUN SERPL-MCNC: 14 MG/DL (ref 7–30)
CALCIUM SERPL-MCNC: 8.6 MG/DL (ref 8.5–10.1)
CHLORIDE BLD-SCNC: 101 MMOL/L (ref 94–109)
CHOLEST SERPL-MCNC: 146 MG/DL
CO2 SERPL-SCNC: 29 MMOL/L (ref 20–32)
CREAT SERPL-MCNC: 0.74 MG/DL (ref 0.66–1.25)
CREAT UR-MCNC: 58 MG/DL
ERYTHROCYTE [DISTWIDTH] IN BLOOD BY AUTOMATED COUNT: 12.2 % (ref 10–15)
FASTING STATUS PATIENT QL REPORTED: YES
GFR SERPL CREATININE-BSD FRML MDRD: >90 ML/MIN/1.73M2
GLUCOSE BLD-MCNC: 101 MG/DL (ref 70–99)
HCT VFR BLD AUTO: 45 % (ref 40–53)
HDLC SERPL-MCNC: 38 MG/DL
HGB BLD-MCNC: 15.4 G/DL (ref 13.3–17.7)
LDLC SERPL CALC-MCNC: 78 MG/DL
MCH RBC QN AUTO: 29.6 PG (ref 26.5–33)
MCHC RBC AUTO-ENTMCNC: 34.2 G/DL (ref 31.5–36.5)
MCV RBC AUTO: 87 FL (ref 78–100)
MICROALBUMIN UR-MCNC: 9 MG/L
MICROALBUMIN/CREAT UR: 15.52 MG/G CR (ref 0–17)
NONHDLC SERPL-MCNC: 108 MG/DL
PLATELET # BLD AUTO: 317 10E3/UL (ref 150–450)
POTASSIUM BLD-SCNC: 3.9 MMOL/L (ref 3.4–5.3)
PROT SERPL-MCNC: 7.1 G/DL (ref 6.8–8.8)
RBC # BLD AUTO: 5.2 10E6/UL (ref 4.4–5.9)
SODIUM SERPL-SCNC: 136 MMOL/L (ref 133–144)
TRIGL SERPL-MCNC: 151 MG/DL
WBC # BLD AUTO: 9.4 10E3/UL (ref 4–11)

## 2022-09-02 PROCEDURE — 87389 HIV-1 AG W/HIV-1&-2 AB AG IA: CPT | Performed by: FAMILY MEDICINE

## 2022-09-02 PROCEDURE — 86803 HEPATITIS C AB TEST: CPT | Performed by: FAMILY MEDICINE

## 2022-09-02 PROCEDURE — 80053 COMPREHEN METABOLIC PANEL: CPT

## 2022-09-02 PROCEDURE — 80061 LIPID PANEL: CPT

## 2022-09-02 PROCEDURE — 36415 COLL VENOUS BLD VENIPUNCTURE: CPT

## 2022-09-02 PROCEDURE — 85027 COMPLETE CBC AUTOMATED: CPT

## 2022-09-02 PROCEDURE — 82043 UR ALBUMIN QUANTITATIVE: CPT

## 2022-09-07 ENCOUNTER — OFFICE VISIT (OUTPATIENT)
Dept: FAMILY MEDICINE | Facility: CLINIC | Age: 42
End: 2022-09-07
Payer: COMMERCIAL

## 2022-09-07 VITALS
HEART RATE: 75 BPM | OXYGEN SATURATION: 99 % | TEMPERATURE: 97.7 F | RESPIRATION RATE: 16 BRPM | SYSTOLIC BLOOD PRESSURE: 117 MMHG | HEIGHT: 69 IN | DIASTOLIC BLOOD PRESSURE: 83 MMHG | WEIGHT: 199.4 LBS | BODY MASS INDEX: 29.53 KG/M2

## 2022-09-07 DIAGNOSIS — Z00.00 ROUTINE GENERAL MEDICAL EXAMINATION AT A HEALTH CARE FACILITY: Primary | ICD-10-CM

## 2022-09-07 DIAGNOSIS — Z11.4 SCREENING FOR HIV (HUMAN IMMUNODEFICIENCY VIRUS): ICD-10-CM

## 2022-09-07 DIAGNOSIS — I10 HYPERTENSION GOAL BP (BLOOD PRESSURE) < 140/90: ICD-10-CM

## 2022-09-07 DIAGNOSIS — L20.89 OTHER ATOPIC DERMATITIS: ICD-10-CM

## 2022-09-07 DIAGNOSIS — Z11.59 NEED FOR HEPATITIS C SCREENING TEST: ICD-10-CM

## 2022-09-07 LAB
HCV AB SERPL QL IA: NONREACTIVE
HIV 1+2 AB+HIV1 P24 AG SERPL QL IA: NONREACTIVE

## 2022-09-07 PROCEDURE — 99213 OFFICE O/P EST LOW 20 MIN: CPT | Mod: 25 | Performed by: FAMILY MEDICINE

## 2022-09-07 PROCEDURE — 99396 PREV VISIT EST AGE 40-64: CPT | Performed by: FAMILY MEDICINE

## 2022-09-07 RX ORDER — BETAMETHASONE DIPROPIONATE 0.5 MG/G
CREAM TOPICAL 2 TIMES DAILY
Qty: 50 G | Refills: 0 | Status: SHIPPED | OUTPATIENT
Start: 2022-09-07

## 2022-09-07 RX ORDER — LOSARTAN POTASSIUM AND HYDROCHLOROTHIAZIDE 12.5; 5 MG/1; MG/1
1 TABLET ORAL DAILY
Qty: 90 TABLET | Refills: 3 | Status: SHIPPED | OUTPATIENT
Start: 2022-09-07 | End: 2023-09-20

## 2022-09-07 RX ORDER — AMLODIPINE BESYLATE 10 MG/1
10 TABLET ORAL DAILY
Qty: 90 TABLET | Refills: 3 | Status: SHIPPED | OUTPATIENT
Start: 2022-09-07 | End: 2023-09-20

## 2022-09-07 ASSESSMENT — PAIN SCALES - GENERAL: PAINLEVEL: NO PAIN (0)

## 2022-09-07 NOTE — PROGRESS NOTES
SUBJECTIVE:   CC: Sin Murreita Jr. is an 42 year old male who presents for preventative health visit.         Healthy Habits:     Taking medications regularly:  0    PHQ-2 Total Score: 0  History of Present Illness       Reason for visit:  Annual Check    He eats 2-3 servings of fruits and vegetables daily.He consumes 1 sweetened beverage(s) daily.He exercises with enough effort to increase his heart rate 10 to 19 minutes per day.  He exercises with enough effort to increase his heart rate 3 or less days per week.   He is taking medications regularly.          Hypertension Follow-up      Do you check your blood pressure regularly outside of the clinic? Yes , occasionally    Are you following a low salt diet? Yes    Are your blood pressures ever more than 140 on the top number (systolic) OR more   than 90 on the bottom number (diastolic), for example 140/90? No      Today's PHQ-2 Score:   PHQ-2 (  [employer]) 2022   Q1: Little interest or pleasure in doing things 0   Q2: Feeling down, depressed or hopeless 0   PHQ-2 Score 0   PHQ-2 Total Score (12-17 Years)- Positive if 3 or more points; Administer PHQ-A if positive -   Q1: Little interest or pleasure in doing things Not at all   Q2: Feeling down, depressed or hopeless Not at all   PHQ-2 Score 0       Abuse: Current or Past(Physical, Sexual or Emotional)- No  Do you feel safe in your environment? Yes    Have you ever done Advance Care Planning? (For example, a Health Directive, POLST, or a discussion with a medical provider or your loved ones about your wishes): no    Social History     Tobacco Use     Smoking status: Former Smoker     Quit date: 2005     Years since quittin.6     Smokeless tobacco: Never Used   Substance Use Topics     Alcohol use: Yes     Comment: weekends - 6 drinks per week     If you drink alcohol do you typically have >3 drinks per day or >7 drinks per week? No    No flowsheet data found.No flowsheet data found.    Last PSA: No  results found for: PSA    Reviewed orders with patient. Reviewed health maintenance and updated orders accordingly - Yes  Lab work is in process  Labs reviewed in EPIC  BP Readings from Last 3 Encounters:   22 117/83   12/15/20 130/88   19 116/80    Wt Readings from Last 3 Encounters:   22 90.4 kg (199 lb 6.4 oz)   12/15/20 89.3 kg (196 lb 14.4 oz)   19 89.5 kg (197 lb 4.8 oz)                  Patient Active Problem List   Diagnosis     Hypertension goal BP (blood pressure) < 140/90     CARDIOVASCULAR SCREENING; LDL GOAL LESS THAN 160     Overweight (BMI 25.0-29.9)     Common wart, hand     Leukocytosis, unspecified type     Phimosis     Encounter for vasectomy     Elevated ALT measurement     Past Surgical History:   Procedure Laterality Date     HERNIA REPAIR         Social History     Tobacco Use     Smoking status: Former Smoker     Quit date: 2005     Years since quittin.6     Smokeless tobacco: Never Used   Substance Use Topics     Alcohol use: Yes     Comment: weekends - 6 drinks per week     Family History   Problem Relation Age of Onset     Cerebrovascular Disease Father      Hepatitis Brother          Current Outpatient Medications   Medication Sig Dispense Refill     amLODIPine (NORVASC) 10 MG tablet Take 1 tablet (10 mg) by mouth daily 90 tablet 3     augmented betamethasone dipropionate (DIPROLENE AF) 0.05 % external cream Apply topically 2 times daily 50 g 0     Digital Therapy (BLOOD PRESSURE MONITORING SOLN) KIT        losartan-hydrochlorothiazide (HYZAAR) 50-12.5 MG tablet Take 1 tablet by mouth daily 90 tablet 3     No Known Allergies  Recent Labs   Lab Test 22  0903 20  0000 19  1639 19  0821 18  0000 01/10/18  0707   LDL 78  --   --   --  111* 69   HDL 38* 43  --   --  49 41   TRIG 151*  --   --   --  144 235*   ALT 46 37 53  --  34 46   CR 0.74 0.89 0.81   < > 0.78 0.97   GFRESTIMATED >90 108 >90   < > 116 87   GFRESTBLACK  --  126  ">90   < > 134 >90   POTASSIUM 3.9 5.1 3.6   < > 4.3 3.9   TSH  --   --   --   --   --  1.88    < > = values in this interval not displayed.        Reviewed and updated as needed this visit by clinical staff   Tobacco  Allergies  Meds   Med Hx  Surg Hx  Fam Hx  Soc Hx          Reviewed and updated as needed this visit by Provider     Meds                 Past Medical History:   Diagnosis Date     Hypertension       Past Surgical History:   Procedure Laterality Date     HERNIA REPAIR       OB History   No obstetric history on file.       Review of Systems  CONSTITUTIONAL: NEGATIVE for fever, chills, change in weight  INTEGUMENTARY/SKIN: History of eczema  EYES: NEGATIVE for vision changes or irritation  ENT: NEGATIVE for ear, mouth and throat problems  RESP: NEGATIVE for significant cough or SOB  CV: NEGATIVE for chest pain, palpitations or peripheral edema  CV: History of hypertension  GI: NEGATIVE for nausea, abdominal pain, heartburn, or change in bowel habits   male: negative for dysuria, hematuria, decreased urinary stream, erectile dysfunction, urethral discharge  MUSCULOSKELETAL: NEGATIVE for significant arthralgias or myalgia  NEURO: NEGATIVE for weakness, dizziness or paresthesias  ENDOCRINE: NEGATIVE for temperature intolerance, skin/hair changes  HEME/ALLERGY/IMMUNE: NEGATIVE for bleeding problems  PSYCHIATRIC: NEGATIVE for changes in mood or affect    OBJECTIVE:   /83   Pulse 75   Temp 97.7  F (36.5  C) (Temporal)   Resp 16   Ht 1.753 m (5' 9\")   Wt 90.4 kg (199 lb 6.4 oz)   SpO2 99%   BMI 29.45 kg/m      Physical Exam  GENERAL: healthy, alert and no distress  EYES: Eyes grossly normal to inspection, PERRL and conjunctivae and sclerae normal  HENT: ear canals and TM's normal, nose and mouth without ulcers or lesions  NECK: no adenopathy, no asymmetry, masses, or scars and thyroid normal to palpation  RESP: lungs clear to auscultation - no rales, rhonchi or wheezes  CV: regular rate " and rhythm, normal S1 S2, no S3 or S4, no murmur, click or rub, no peripheral edema and peripheral pulses strong  ABDOMEN: soft, nontender, no hepatosplenomegaly, no masses and bowel sounds normal  MS: no gross musculoskeletal defects noted, no edema  SKIN: Erythematous, dry, patch of eczematous dermatitis on the mid anterior legs on both sides  NEURO: Normal strength and tone, mentation intact and speech normal  PSYCH: mentation appears normal, affect normal/bright    Diagnostic Test Results:  Labs reviewed in Epic  Lab Results   Component Value Date    WBC 9.4 09/02/2022    WBC 13.0 11/26/2019     Lab Results   Component Value Date    RBC 5.20 09/02/2022    RBC 5.32 11/26/2019     Lab Results   Component Value Date    HGB 15.4 09/02/2022    HGB 15.8 11/26/2019     Lab Results   Component Value Date    HCT 45.0 09/02/2022    HCT 46.4 11/26/2019     No components found for: MCT  Lab Results   Component Value Date    MCV 87 09/02/2022    MCV 87 11/26/2019     Lab Results   Component Value Date    MCH 29.6 09/02/2022    MCH 29.7 11/26/2019     Lab Results   Component Value Date    MCHC 34.2 09/02/2022    MCHC 34.1 11/26/2019     Lab Results   Component Value Date    RDW 12.2 09/02/2022    RDW 12.5 11/26/2019     Lab Results   Component Value Date     09/02/2022     11/26/2019     Last Comprehensive Metabolic Panel:  Sodium   Date Value Ref Range Status   09/02/2022 136 133 - 144 mmol/L Final   11/26/2019 138 133 - 144 mmol/L Final     Potassium   Date Value Ref Range Status   09/02/2022 3.9 3.4 - 5.3 mmol/L Final   11/06/2020 5.1 3.5 - 5.3 mmol/L Final     Chloride   Date Value Ref Range Status   09/02/2022 101 94 - 109 mmol/L Final   11/26/2019 107 94 - 109 mmol/L Final     Carbon Dioxide   Date Value Ref Range Status   11/26/2019 28 20 - 32 mmol/L Final     Carbon Dioxide (CO2)   Date Value Ref Range Status   09/02/2022 29 20 - 32 mmol/L Final     Anion Gap   Date Value Ref Range Status   09/02/2022 6  3 - 14 mmol/L Final   11/26/2019 3 3 - 14 mmol/L Final     Glucose   Date Value Ref Range Status   09/02/2022 101 (H) 70 - 99 mg/dL Final   11/06/2020 93 65 - 99 mg/dL Final     Urea Nitrogen   Date Value Ref Range Status   09/02/2022 14 7 - 30 mg/dL Final   11/26/2019 16 7 - 30 mg/dL Final     Creatinine   Date Value Ref Range Status   09/02/2022 0.74 0.66 - 1.25 mg/dL Final   11/06/2020 0.89 0.60 - 1.35 mg/dL Final     GFR Estimate   Date Value Ref Range Status   09/02/2022 >90 >60 mL/min/1.73m2 Final     Comment:     Effective December 21, 2021 eGFRcr in adults is calculated using the 2021 CKD-EPI creatinine equation which includes age and gender (Anton et al., NEJ, DOI: 10.1056/LTNUfw5491725)   11/06/2020 108 >=60 ml/min/1.73m2 Final     Calcium   Date Value Ref Range Status   09/02/2022 8.6 8.5 - 10.1 mg/dL Final   11/26/2019 8.8 8.5 - 10.1 mg/dL Final     Bilirubin Total   Date Value Ref Range Status   09/02/2022 0.5 0.2 - 1.3 mg/dL Final   11/26/2019 0.3 0.2 - 1.3 mg/dL Final     Alkaline Phosphatase   Date Value Ref Range Status   09/02/2022 95 40 - 150 U/L Final   11/26/2019 120 40 - 150 U/L Final     ALT   Date Value Ref Range Status   09/02/2022 46 0 - 70 U/L Final   11/06/2020 37 9 - 46 U/L Final     AST   Date Value Ref Range Status   09/02/2022 22 0 - 45 U/L Final   11/06/2020 20 10 - 40 U/L Final             Lab Results   Component Value Date    CHOL 146 09/02/2022    CHOL 170 11/06/2020     Lab Results   Component Value Date    HDL 38 09/02/2022    HDL 43 11/06/2020     Lab Results   Component Value Date    LDL 78 09/02/2022     07/30/2018     Lab Results   Component Value Date    TRIG 151 09/02/2022    TRIG 144 07/30/2018     No results found for: CHOLHDLRATIO  Lab Results   Component Value Date    MICROL 9 09/02/2022    MICROL 30 12/16/2020     No results found for: MICROALBUMIN      ASSESSMENT/PLAN:     Assessment & Plan     Routine general medical examination at a health care facility  :  Discussed on regular exercises, healthy eating,  and routine dental checks.    Screening for HIV (human immunodeficiency virus)    - HIV Antigen Antibody Combo    Need for hepatitis C screening test    - Hepatitis C antibody    Hypertension goal BP (blood pressure) < 140/90  BP Readings from Last 6 Encounters:   09/07/22 117/83   12/15/20 130/88   11/26/19 116/80   10/01/19 (!) 148/98   04/23/19 131/76   01/11/19 133/89     Last Comprehensive Metabolic Panel:  Sodium   Date Value Ref Range Status   09/02/2022 136 133 - 144 mmol/L Final   11/26/2019 138 133 - 144 mmol/L Final     Potassium   Date Value Ref Range Status   09/02/2022 3.9 3.4 - 5.3 mmol/L Final   11/06/2020 5.1 3.5 - 5.3 mmol/L Final     Chloride   Date Value Ref Range Status   09/02/2022 101 94 - 109 mmol/L Final   11/26/2019 107 94 - 109 mmol/L Final     Carbon Dioxide   Date Value Ref Range Status   11/26/2019 28 20 - 32 mmol/L Final     Carbon Dioxide (CO2)   Date Value Ref Range Status   09/02/2022 29 20 - 32 mmol/L Final     Anion Gap   Date Value Ref Range Status   09/02/2022 6 3 - 14 mmol/L Final   11/26/2019 3 3 - 14 mmol/L Final     Glucose   Date Value Ref Range Status   09/02/2022 101 (H) 70 - 99 mg/dL Final   11/06/2020 93 65 - 99 mg/dL Final     Urea Nitrogen   Date Value Ref Range Status   09/02/2022 14 7 - 30 mg/dL Final   11/26/2019 16 7 - 30 mg/dL Final     Creatinine   Date Value Ref Range Status   09/02/2022 0.74 0.66 - 1.25 mg/dL Final   11/06/2020 0.89 0.60 - 1.35 mg/dL Final     GFR Estimate   Date Value Ref Range Status   09/02/2022 >90 >60 mL/min/1.73m2 Final     Comment:     Effective December 21, 2021 eGFRcr in adults is calculated using the 2021 CKD-EPI creatinine equation which includes age and gender (Anton carey al., NEJM, DOI: 10.1056/IFXTmn7671800)   11/06/2020 108 >=60 ml/min/1.73m2 Final     Calcium   Date Value Ref Range Status   09/02/2022 8.6 8.5 - 10.1 mg/dL Final   11/26/2019 8.8 8.5 - 10.1 mg/dL Final     Lab  "Results   Component Value Date    MICROL 9 09/02/2022    MICROL 30 12/16/2020     No results found for: MICROALBUMIN    Blood pressure is at goal  Reviewed normal renal functions, potassium, negative urine microalbumin  Recommended to continue current medications, refills were given today  Will follow low salt diet, weight loss and regular exercises.'  Follow-up in 1 year or sooner if needed      - losartan-hydrochlorothiazide (HYZAAR) 50-12.5 MG tablet; Take 1 tablet by mouth daily  - amLODIPine (NORVASC) 10 MG tablet; Take 1 tablet (10 mg) by mouth daily    Other atopic dermatitis  Emphasized on using daily moisturizers liberally  Use topical Diprolene cream twice daily as needed for flareups  - augmented betamethasone dipropionate (DIPROLENE AF) 0.05 % external cream; Apply topically 2 times daily    Review of the result(s) of each unique test - CBC, CMP, lipids, urine microalbumin         BMI:   Estimated body mass index is 29.45 kg/m  as calculated from the following:    Height as of this encounter: 1.753 m (5' 9\").    Weight as of this encounter: 90.4 kg (199 lb 6.4 oz).   Weight management plan: Discussed healthy diet and exercise guidelines    Work on weight loss  Regular exercise  Chart documentation done in part with Dragon Voice recognition Software. Although reviewed after completion, some word and grammatical error may remain.    See Patient Instructions    Return in about 1 year (around 9/7/2023), or if symptoms worsen or fail to improve, for Physical Exam.    Jose Holden MD  [facility]        COUNSELING:   Reviewed preventive health counseling, as reflected in patient instructions  Special attention given to:        Regular exercise       Healthy diet/nutrition       Vision screening       Immunizations    Declined: Influenza due to Other -and wants to have it done at a later date           Consider Hep C screening for all patients one time for ages 18-79 years       " "HIV screeninx in teen years, 1x in adult years, and at intervals if high risk       The 10-year ASCVD risk score (Anuja CORBIN Jr., et al., 2013) is: 0.9%    Values used to calculate the score:      Age: 42 years      Sex: Male      Is Non- : No      Diabetic: No      Tobacco smoker: No      Systolic Blood Pressure: 117 mmHg      Is BP treated: Yes      HDL Cholesterol: 38 mg/dL      Total Cholesterol: 146 mg/dL    Estimated body mass index is 29.45 kg/m  as calculated from the following:    Height as of this encounter: 1.753 m (5' 9\").    Weight as of this encounter: 90.4 kg (199 lb 6.4 oz).         He reports that he quit smoking about 17 years ago. He has never used smokeless tobacco.      Counseling Resources:  ATP IV Guidelines  Pooled Cohorts Equation Calculator  FRAX Risk Assessment  ICSI Preventive Guidelines  Dietary Guidelines for Americans, 2010  USDA's MyPlate  ASA Prophylaxis  Lung CA Screening    Jose Holden MD  [facility]  Chart documentation done in part with Dragon Voice recognition Software. Although reviewed after completion, some word and grammatical error may remain.    "

## 2022-09-07 NOTE — PROGRESS NOTES
"  {PROVIDER CHARTING PREFERENCE:982384}    Anthony Vogt is a 42 year old, presenting for the following health issues:  Hypertension    Scheduled for physical in January.     History of Present Illness       Reason for visit:  Annual Check    He eats 2-3 servings of fruits and vegetables daily.He consumes 1 sweetened beverage(s) daily.He exercises with enough effort to increase his heart rate 10 to 19 minutes per day.  He exercises with enough effort to increase his heart rate 3 or less days per week.   He is taking medications regularly.       Hypertension Follow-up      Do you check your blood pressure regularly outside of the clinic? Yes     Are you following a low salt diet? Yes    Are your blood pressures ever more than 140 on the top number (systolic) OR more   than 90 on the bottom number (diastolic), for example 140/90? Yes    {additonal problems for provider to add (Optional):755784}    Review of Systems   {ROS COMP (Optional):311963}      Objective    /83   Pulse 75   Temp 97.7  F (36.5  C) (Temporal)   Resp 16   Ht 1.753 m (5' 9\")   Wt 90.4 kg (199 lb 6.4 oz)   SpO2 99%   BMI 29.45 kg/m    Body mass index is 29.45 kg/m .  Physical Exam   {Exam List (Optional):432019}    {Diagnostic Test Results (Optional):438019}    {AMBULATORY ATTESTATION (Optional):869275}            [unfilled]  @[facility] [place]@  "

## 2022-09-23 ENCOUNTER — OFFICE VISIT (OUTPATIENT)
Dept: URGENT CARE | Facility: URGENT CARE | Age: 42
End: 2022-09-23
Payer: COMMERCIAL

## 2022-09-23 VITALS
HEIGHT: 69 IN | TEMPERATURE: 97.8 F | WEIGHT: 201.6 LBS | DIASTOLIC BLOOD PRESSURE: 98 MMHG | HEART RATE: 81 BPM | SYSTOLIC BLOOD PRESSURE: 141 MMHG | BODY MASS INDEX: 29.86 KG/M2 | OXYGEN SATURATION: 98 %

## 2022-09-23 DIAGNOSIS — M54.6 ACUTE LEFT-SIDED THORACIC BACK PAIN: Primary | ICD-10-CM

## 2022-09-23 PROCEDURE — 99213 OFFICE O/P EST LOW 20 MIN: CPT | Performed by: PHYSICIAN ASSISTANT

## 2022-09-23 RX ORDER — CYCLOBENZAPRINE HCL 10 MG
10 TABLET ORAL
Qty: 15 TABLET | Refills: 0 | Status: SHIPPED | OUTPATIENT
Start: 2022-09-23 | End: 2022-10-08

## 2022-09-23 RX ORDER — IBUPROFEN 800 MG/1
800 TABLET, FILM COATED ORAL EVERY 8 HOURS PRN
Qty: 30 TABLET | Refills: 0 | Status: SHIPPED | OUTPATIENT
Start: 2022-09-23 | End: 2022-10-03

## 2022-09-23 NOTE — PROGRESS NOTES
Chief Complaint   Patient presents with     Back Pain     Acute pain on the left side of his back.          Impression:     ICD-10-CM    1. Acute left-sided thoracic back pain  M54.6 cyclobenzaprine (FLEXERIL) 10 MG tablet     ibuprofen (ADVIL/MOTRIN) 800 MG tablet         Plan: Vital signs stable.  Neurologically intact.  Musculoskeletal etiology of left thoracolumbar pain.  Ibuprofen 800mg prescription.  Flexeril muscle relaxant at bedtime.  Instructions for back care and return precautions discussed.    Follow-up with primary care provider 1 to 2 weeks if not improving.  Sooner as needed.    Natalie Miles PA-C      SUBJECTIVE:  42-year-old male presents for cute onset of left thoracic pain that started 2 nights ago.  No specific injury but had been doing some cleaning the day before.  No prior episodes of back pain.  No nausea or vomiting.  No lower extremity radicular pain, numbness, tingling, weakness.  Ibuprofen 600 today did help.  No dysuria, frequency, urgency.  No fever, chills, headache.  No abdominal pain.  No rash.        No Known Allergies    Past Medical History:   Diagnosis Date     Hypertension        amLODIPine (NORVASC) 10 MG tablet, Take 1 tablet (10 mg) by mouth daily  Digital Therapy (BLOOD PRESSURE MONITORING SOLN) KIT,   losartan-hydrochlorothiazide (HYZAAR) 50-12.5 MG tablet, Take 1 tablet by mouth daily  augmented betamethasone dipropionate (DIPROLENE AF) 0.05 % external cream, Apply topically 2 times daily (Patient not taking: Reported on 9/23/2022)    No current facility-administered medications on file prior to visit.      Past Surgical History:   Procedure Laterality Date     HERNIA REPAIR         Social History     Socioeconomic History     Marital status:      Spouse name: Not on file     Number of children: Not on file     Years of education: Not on file     Highest education level: Not on file   Occupational History     Not on file   Tobacco Use     Smoking status:  "Former Smoker     Quit date: 2005     Years since quittin.7     Smokeless tobacco: Never Used   Vaping Use     Vaping Use: Never used   Substance and Sexual Activity     Alcohol use: Yes     Comment: weekends - 6 drinks per week     Drug use: No     Sexual activity: Yes     Partners: Female     Birth control/protection: Male Surgical   Other Topics Concern     Parent/sibling w/ CABG, MI or angioplasty before 65F 55M? Yes   Social History Narrative     Not on file     Social Determinants of Health     Financial Resource Strain: Not on file   Food Insecurity: Not on file   Transportation Needs: Not on file   Physical Activity: Not on file   Stress: Not on file   Social Connections: Not on file   Intimate Partner Violence: Not on file   Housing Stability: Not on file       REVIEW OF SYSTEMS  General: negative for fever  Resp: negative for chest pain   CV: negative for chest pain  : negative for dysuria , incontinence, frequency  Musculoskeletal: as above  Neurologic: negative for ataxia, saddle anesthesia, fecal incontinence, one sided weakness,  paresthesias    Physical Exam:  Vitals: BP (!) 141/98 (BP Location: Left arm, Patient Position: Sitting, Cuff Size: Adult Regular)   Pulse 81   Temp 97.8  F (36.6  C) (Tympanic)   Ht 1.753 m (5' 9\")   Wt 91.4 kg (201 lb 9.6 oz)   SpO2 98%   BMI 29.77 kg/m    BMI= Body mass index is 29.77 kg/m .  Constitutional: healthy, alert and no acute distress   CARDIO: RRR, no MRG  RESP: lungs CTA, NAD  NEURO: Patellar  and ankle reflexes intact and equal bilaterally  BACK: Palpable tenderness and muscle spasm left thoracolumbar area.  Made worse with forward flexion and extension.  Straight leg raise intact, strength intact and equal bilateral lower extremities  GAIT: intact  Psychiatric: mentation appears normal and affect normal/bright  No rash.  Hips nontender with full range of motion.    Natalie Miles PA-C      "

## 2023-01-23 ENCOUNTER — HEALTH MAINTENANCE LETTER (OUTPATIENT)
Age: 43
End: 2023-01-23

## 2023-10-07 ENCOUNTER — HEALTH MAINTENANCE LETTER (OUTPATIENT)
Age: 43
End: 2023-10-07

## 2023-11-07 ENCOUNTER — DOCUMENTATION ONLY (OUTPATIENT)
Dept: FAMILY MEDICINE | Facility: CLINIC | Age: 43
End: 2023-11-07
Payer: COMMERCIAL

## 2023-11-07 DIAGNOSIS — Z13.0 SCREENING FOR DEFICIENCY ANEMIA: Primary | ICD-10-CM

## 2023-11-07 DIAGNOSIS — I10 HYPERTENSION GOAL BP (BLOOD PRESSURE) < 140/90: ICD-10-CM

## 2023-11-07 DIAGNOSIS — Z13.1 SCREENING FOR DIABETES MELLITUS (DM): ICD-10-CM

## 2023-11-07 DIAGNOSIS — Z13.220 SCREENING FOR HYPERLIPIDEMIA: ICD-10-CM

## 2023-11-07 NOTE — PROGRESS NOTES
Patient have a lab appointment on 11-17-23, but there is no order. Could you order please if needed.  Thank you,  Susan Chowdary MLT(ASCP)

## 2023-11-17 ENCOUNTER — LAB (OUTPATIENT)
Dept: LAB | Facility: CLINIC | Age: 43
End: 2023-11-17
Payer: COMMERCIAL

## 2023-11-17 DIAGNOSIS — Z13.1 SCREENING FOR DIABETES MELLITUS (DM): ICD-10-CM

## 2023-11-17 DIAGNOSIS — Z13.220 SCREENING FOR HYPERLIPIDEMIA: ICD-10-CM

## 2023-11-17 DIAGNOSIS — Z13.0 SCREENING FOR DEFICIENCY ANEMIA: ICD-10-CM

## 2023-11-17 DIAGNOSIS — I10 HYPERTENSION GOAL BP (BLOOD PRESSURE) < 140/90: ICD-10-CM

## 2023-11-17 LAB
ALBUMIN SERPL BCG-MCNC: 4.2 G/DL (ref 3.5–5.2)
ALP SERPL-CCNC: 96 U/L (ref 40–150)
ALT SERPL W P-5'-P-CCNC: 39 U/L (ref 0–70)
ANION GAP SERPL CALCULATED.3IONS-SCNC: 10 MMOL/L (ref 7–15)
AST SERPL W P-5'-P-CCNC: 26 U/L (ref 0–45)
BILIRUB SERPL-MCNC: 0.6 MG/DL
BUN SERPL-MCNC: 14.9 MG/DL (ref 6–20)
CALCIUM SERPL-MCNC: 9.1 MG/DL (ref 8.6–10)
CHLORIDE SERPL-SCNC: 100 MMOL/L (ref 98–107)
CHOLEST SERPL-MCNC: 159 MG/DL
CREAT SERPL-MCNC: 0.77 MG/DL (ref 0.67–1.17)
CREAT UR-MCNC: 69.9 MG/DL
DEPRECATED HCO3 PLAS-SCNC: 27 MMOL/L (ref 22–29)
EGFRCR SERPLBLD CKD-EPI 2021: >90 ML/MIN/1.73M2
ERYTHROCYTE [DISTWIDTH] IN BLOOD BY AUTOMATED COUNT: 12.7 % (ref 10–15)
GLUCOSE SERPL-MCNC: 99 MG/DL (ref 70–99)
HCT VFR BLD AUTO: 45.4 % (ref 40–53)
HDLC SERPL-MCNC: 37 MG/DL
HGB BLD-MCNC: 15.3 G/DL (ref 13.3–17.7)
LDLC SERPL CALC-MCNC: 97 MG/DL
MCH RBC QN AUTO: 29.4 PG (ref 26.5–33)
MCHC RBC AUTO-ENTMCNC: 33.7 G/DL (ref 31.5–36.5)
MCV RBC AUTO: 87 FL (ref 78–100)
MICROALBUMIN UR-MCNC: <12 MG/L
MICROALBUMIN/CREAT UR: NORMAL MG/G{CREAT}
NONHDLC SERPL-MCNC: 122 MG/DL
PLATELET # BLD AUTO: 346 10E3/UL (ref 150–450)
POTASSIUM SERPL-SCNC: 4.1 MMOL/L (ref 3.4–5.3)
PROT SERPL-MCNC: 6.9 G/DL (ref 6.4–8.3)
RBC # BLD AUTO: 5.2 10E6/UL (ref 4.4–5.9)
SODIUM SERPL-SCNC: 137 MMOL/L (ref 135–145)
TRIGL SERPL-MCNC: 125 MG/DL
WBC # BLD AUTO: 9.2 10E3/UL (ref 4–11)

## 2023-11-17 PROCEDURE — 36415 COLL VENOUS BLD VENIPUNCTURE: CPT

## 2023-11-17 PROCEDURE — 85027 COMPLETE CBC AUTOMATED: CPT

## 2023-11-17 PROCEDURE — 80053 COMPREHEN METABOLIC PANEL: CPT

## 2023-11-17 PROCEDURE — 80061 LIPID PANEL: CPT

## 2023-11-17 PROCEDURE — 82570 ASSAY OF URINE CREATININE: CPT

## 2023-11-17 PROCEDURE — 82043 UR ALBUMIN QUANTITATIVE: CPT

## 2023-11-20 ENCOUNTER — OFFICE VISIT (OUTPATIENT)
Dept: FAMILY MEDICINE | Facility: CLINIC | Age: 43
End: 2023-11-20
Payer: COMMERCIAL

## 2023-11-20 VITALS
HEIGHT: 69 IN | DIASTOLIC BLOOD PRESSURE: 80 MMHG | HEART RATE: 79 BPM | RESPIRATION RATE: 15 BRPM | SYSTOLIC BLOOD PRESSURE: 123 MMHG | TEMPERATURE: 98.3 F | BODY MASS INDEX: 29.73 KG/M2 | WEIGHT: 200.7 LBS | OXYGEN SATURATION: 98 %

## 2023-11-20 DIAGNOSIS — M25.511 CHRONIC RIGHT SHOULDER PAIN: ICD-10-CM

## 2023-11-20 DIAGNOSIS — R06.83 SNORING: ICD-10-CM

## 2023-11-20 DIAGNOSIS — L20.89 OTHER ATOPIC DERMATITIS: ICD-10-CM

## 2023-11-20 DIAGNOSIS — I10 HYPERTENSION GOAL BP (BLOOD PRESSURE) < 140/90: ICD-10-CM

## 2023-11-20 DIAGNOSIS — Z00.00 ROUTINE GENERAL MEDICAL EXAMINATION AT A HEALTH CARE FACILITY: Primary | ICD-10-CM

## 2023-11-20 DIAGNOSIS — G89.29 CHRONIC RIGHT SHOULDER PAIN: ICD-10-CM

## 2023-11-20 PROCEDURE — 99396 PREV VISIT EST AGE 40-64: CPT | Mod: 25 | Performed by: FAMILY MEDICINE

## 2023-11-20 PROCEDURE — 90746 HEPB VACCINE 3 DOSE ADULT IM: CPT | Performed by: FAMILY MEDICINE

## 2023-11-20 PROCEDURE — 99214 OFFICE O/P EST MOD 30 MIN: CPT | Mod: 25 | Performed by: FAMILY MEDICINE

## 2023-11-20 PROCEDURE — 90471 IMMUNIZATION ADMIN: CPT | Performed by: FAMILY MEDICINE

## 2023-11-20 RX ORDER — AMLODIPINE BESYLATE 10 MG/1
10 TABLET ORAL DAILY
Qty: 90 TABLET | Refills: 3 | Status: SHIPPED | OUTPATIENT
Start: 2023-11-20

## 2023-11-20 RX ORDER — LOSARTAN POTASSIUM AND HYDROCHLOROTHIAZIDE 12.5; 5 MG/1; MG/1
1 TABLET ORAL DAILY
Qty: 90 TABLET | Refills: 3 | Status: SHIPPED | OUTPATIENT
Start: 2023-11-20

## 2023-11-20 RX ORDER — BETAMETHASONE DIPROPIONATE 0.5 MG/G
CREAM TOPICAL 2 TIMES DAILY PRN
Qty: 50 G | Refills: 0 | Status: SHIPPED | OUTPATIENT
Start: 2023-11-20

## 2023-11-20 ASSESSMENT — ENCOUNTER SYMPTOMS
NAUSEA: 0
DIZZINESS: 0
PALPITATIONS: 0
CHILLS: 0
EYE PAIN: 0
NERVOUS/ANXIOUS: 0
FEVER: 0
JOINT SWELLING: 0
HEMATURIA: 0
MYALGIAS: 0
SHORTNESS OF BREATH: 0
HEARTBURN: 0
ABDOMINAL PAIN: 0
SORE THROAT: 0
PARESTHESIAS: 1
HEADACHES: 0
CONSTIPATION: 0
WEAKNESS: 0
HEMATOCHEZIA: 0
COUGH: 0
FREQUENCY: 0
DYSURIA: 0
ARTHRALGIAS: 1
DIARRHEA: 0

## 2023-11-20 ASSESSMENT — PAIN SCALES - GENERAL: PAINLEVEL: NO PAIN (1)

## 2023-11-20 NOTE — NURSING NOTE
Prior to immunization administration, verified patients identity using patient s name and date of birth. Please see Immunization Activity for additional information.     Screening Questionnaire for Adult Immunization    Are you sick today?   No   Do you have allergies to medications, food, a vaccine component or latex?   No   Have you ever had a serious reaction after receiving a vaccination?   No   Do you have a long-term health problem with heart, lung, kidney, or metabolic disease (e.g., diabetes), asthma, a blood disorder, no spleen, complement component deficiency, a cochlear implant, or a spinal fluid leak?  Are you on long-term aspirin therapy?   No   Do you have cancer, leukemia, HIV/AIDS, or any other immune system problem?   No   Do you have a parent, brother, or sister with an immune system problem?   No   In the past 3 months, have you taken medications that affect  your immune system, such as prednisone, other steroids, or anticancer drugs; drugs for the treatment of rheumatoid arthritis, Crohn s disease, or psoriasis; or have you had radiation treatments?   No   Have you had a seizure, or a brain or other nervous system problem?   No   During the past year, have you received a transfusion of blood or blood    products, or been given immune (gamma) globulin or antiviral drug?   No   For women: Are you pregnant or is there a chance you could become       pregnant during the next month?   No   Have you received any vaccinations in the past 4 weeks?   No     Immunization questionnaire answers were all negative.      Patient instructed to remain in clinic for 15 minutes afterwards, and to report any adverse reactions.     Screening performed by Anabelle Herrera MA on 11/20/2023 at 3:10 PM.

## 2023-11-20 NOTE — PROGRESS NOTES
SUBJECTIVE:   Sin is a 43 year old, presenting for the following:  Physical (Annual exam)        11/20/2023     2:45 PM   Additional Questions   Roomed by Pauly SUGGS   Accompanied by Self         11/20/2023     2:45 PM   Patient Reported Additional Medications   Patient reports taking the following new medications None       Healthy Habits:     Getting at least 3 servings of Calcium per day:  Yes    Bi-annual eye exam:  NO    Dental care twice a year:  NO    Sleep apnea or symptoms of sleep apnea:  Excessive snoring    Diet:  Low salt    Frequency of exercise:  None    Taking medications regularly:  Yes    Medication side effects:  None    Additional concerns today:  Yes      Today's PHQ-2 Score:       11/20/2023     2:38 PM   PHQ-2 ( 1999 [employer])   Q1: Little interest or pleasure in doing things 0   Q2: Feeling down, depressed or hopeless 0   PHQ-2 Score 0   Q1: Little interest or pleasure in doing things Not at all   Q2: Feeling down, depressed or hopeless Not at all   PHQ-2 Score 0                 Chronic right shoulder pain-complaining of pain over the right shoulder intermittently since December 2022 that started after he shoveled snow several times  Denies history of fall or direct trauma to the shoulder  Patient is right-handed  Denies left-sided symptoms  He also feels that since that time, he is noticing clicking sensation at the PIP joint of the right fourth finger with no concern for injury to the finger, local swelling, pain, tingling, numbness or weakness of the hands  Past medical history significant for hypertension  Patient reports he tries to sleep on his right side  Hypertension Follow-up    Do you check your blood pressure regularly outside of the clinic?  At office visits  Are you following a low salt diet? No  Are your blood pressures ever more than 140 on the top number (systolic) OR more   than 90 on the bottom number (diastolic), for example 140/90? No  Have you ever done Advance Care  "Planning? (For example, a Health Directive, POLST, or a discussion with a medical provider or your loved ones about your wishes): No    Social History     Tobacco Use    Smoking status: Former     Types: Cigarettes, Cigars     Quit date: 2005     Years since quittin.8     Passive exposure: Never    Smokeless tobacco: Never   Substance Use Topics    Alcohol use: Yes     Comment: weekends - 6 drinks per week             2023     2:37 PM   Alcohol Use   Prescreen: >3 drinks/day or >7 drinks/week? No          No data to display                Last PSA: No results found for: \"PSA\"    Reviewed orders with patient. Reviewed health maintenance and updated orders accordingly - Yes  Labs reviewed in EPIC  BP Readings from Last 3 Encounters:   23 123/80   22 (!) 141/98   22 117/83    Wt Readings from Last 3 Encounters:   23 91 kg (200 lb 11.2 oz)   22 91.4 kg (201 lb 9.6 oz)   22 90.4 kg (199 lb 6.4 oz)                  Patient Active Problem List   Diagnosis    Hypertension goal BP (blood pressure) < 140/90    CARDIOVASCULAR SCREENING; LDL GOAL LESS THAN 160    Overweight (BMI 25.0-29.9)    Common wart, hand    Leukocytosis, unspecified type    Phimosis    Encounter for vasectomy    Elevated ALT measurement    Other atopic dermatitis     Past Surgical History:   Procedure Laterality Date    HERNIA REPAIR         Social History     Tobacco Use    Smoking status: Former     Types: Cigarettes, Cigars     Quit date: 2005     Years since quittin.8     Passive exposure: Never    Smokeless tobacco: Never   Substance Use Topics    Alcohol use: Yes     Comment: weekends - 6 drinks per week     Family History   Problem Relation Age of Onset    Cerebrovascular Disease Father     Hepatitis Brother          Current Outpatient Medications   Medication Sig Dispense Refill    amLODIPine (NORVASC) 10 MG tablet Take 1 tablet (10 mg) by mouth daily Please Call to schedule for your " physical and medication check- you are overdue 90 tablet 3    augmented betamethasone dipropionate (DIPROLENE AF) 0.05 % external cream Apply topically 2 times daily as needed 50 g 0    losartan-hydrochlorothiazide (HYZAAR) 50-12.5 MG tablet Take 1 tablet by mouth daily Please Call to schedule for your physical and medication check- you are overdue 90 tablet 3    augmented betamethasone dipropionate (DIPROLENE AF) 0.05 % external cream Apply topically 2 times daily 50 g 0    Digital Therapy (BLOOD PRESSURE MONITORING SOLN) KIT        No Known Allergies  Recent Labs   Lab Test 11/17/23  0805 09/02/22  0903 11/06/20  0000 11/26/19  1639 01/11/19  0821 07/30/18  0000 01/10/18  0707   LDL 97 78  --   --   --  111* 69   HDL 37* 38* 43  --   --  49 41   TRIG 125 151*  --   --   --  144 235*   ALT 39 46 37 53  --  34 46   CR 0.77 0.74 0.89 0.81   < > 0.78 0.97   GFRESTIMATED >90 >90 108 >90   < > 116 87   GFRESTBLACK  --   --  126 >90   < > 134 >90   POTASSIUM 4.1 3.9 5.1 3.6   < > 4.3 3.9   TSH  --   --   --   --   --   --  1.88    < > = values in this interval not displayed.        Reviewed and updated as needed this visit by clinical staff   Tobacco  Allergies  Meds              Reviewed and updated as needed this visit by Provider                   Past Medical History:   Diagnosis Date    Hypertension       Past Surgical History:   Procedure Laterality Date    HERNIA REPAIR         Review of Systems   Constitutional:  Negative for chills and fever.   HENT:  Negative for congestion, ear pain, hearing loss and sore throat.    Eyes:  Negative for pain and visual disturbance.   Respiratory:  Negative for cough and shortness of breath.    Cardiovascular:  Negative for chest pain, palpitations and peripheral edema.   Gastrointestinal:  Negative for abdominal pain, constipation, diarrhea, heartburn, hematochezia and nausea.   Genitourinary:  Negative for dysuria, frequency, genital sores, hematuria, impotence, penile  "discharge and urgency.   Musculoskeletal:  Positive for arthralgias. Negative for joint swelling and myalgias.   Skin:  Negative for rash.   Neurological:  Positive for paresthesias. Negative for dizziness, weakness and headaches.   Psychiatric/Behavioral:  Negative for mood changes. The patient is not nervous/anxious.      CONSTITUTIONAL: NEGATIVE for fever, chills, change in weight  INTEGUMENTARY/SKIN: History of atopic dermatitis  EYES: NEGATIVE for vision changes or irritation  ENT: NEGATIVE for ear, mouth and throat problems  RESP: NEGATIVE for significant cough or SOB  CV: History of hypertension  GI: NEGATIVE for nausea, abdominal pain, heartburn, or change in bowel habits   male: negative for dysuria, hematuria, decreased urinary stream, erectile dysfunction, urethral discharge  MUSCULOSKELETAL: Right shoulder pain as mentioned above  NEURO: NEGATIVE for weakness, dizziness or paresthesias  ENDOCRINE: NEGATIVE for temperature intolerance, skin/hair changes  HEME/ALLERGY/IMMUNE: NEGATIVE for bleeding problems  PSYCHIATRIC: NEGATIVE for changes in mood or affect    OBJECTIVE:   /80 (BP Location: Right arm, Patient Position: Sitting, Cuff Size: Adult Regular)   Pulse 79   Temp 98.3  F (36.8  C) (Oral)   Resp 15   Ht 1.753 m (5' 9\")   Wt 91 kg (200 lb 11.2 oz)   SpO2 98%   BMI 29.64 kg/m      Physical Exam  GENERAL: healthy, alert and no distress  EYES: Eyes grossly normal to inspection, PERRL and conjunctivae and sclerae normal  HENT: ear canals and TM's normal, nose and mouth without ulcers or lesions  NECK: no adenopathy, no asymmetry, masses, or scars and thyroid normal to palpation  RESP: lungs clear to auscultation - no rales, rhonchi or wheezes  CV: regular rate and rhythm, normal S1 S2, no S3 or S4, no murmur, click or rub, no peripheral edema and peripheral pulses strong  ABDOMEN: soft, nontender, no hepatosplenomegaly, no masses and bowel sounds normal  MS: Right shoulder-nontender on " exam, full range of motion except for pain during external rotation and abduction, negative impingement test  SKIN: Dry eczematous patches of skin on both lower legs  NEURO: Normal strength and tone, mentation intact and speech normal  PSYCH: mentation appears normal, affect normal/bright    Diagnostic Test Results:  Labs reviewed in Epic    ASSESSMENT/PLAN:   (Z00.00) Routine general medical examination at a health care facility  (primary encounter diagnosis)  Comment:   Plan: : Discussed on regular exercises, healthy eating,   and routine dental checks.    (M25.511,  G89.29) Chronic right shoulder pain  Comment:   Plan: Physical Therapy Referral        Ddx-rotator cuff injury versus tendinitis  Recommended to avoid triggering activities, avoid lying on that side, local ice and heat as needed, topical diclofenac over-the-counter gel 4 times daily as needed for pain, start on physical therapy  If no improvement noted in 2 months of PT, will consider sports medicine consult for possible corticosteroid injection  .vwer      (L20.89) Other atopic dermatitis  Comment:   Plan: augmented betamethasone dipropionate (DIPROLENE        AF) 0.05 % external cream        Continue with liberal moisturizer use, topical Diprolene use twice daily as needed for severe symptoms  Follow-up in 1 year or sooner if needed    (I10) Hypertension goal BP (blood pressure) < 140/90  Comment:   Plan: amLODIPine (NORVASC) 10 MG tablet,         losartan-hydrochlorothiazide (HYZAAR) 50-12.5         MG tablet          BP Readings from Last 6 Encounters:   11/20/23 123/80   09/23/22 (!) 141/98   09/07/22 117/83   12/15/20 130/88   11/26/19 116/80   10/01/19 (!) 148/98     Last Comprehensive Metabolic Panel:  Lab Results   Component Value Date     11/17/2023    POTASSIUM 4.1 11/17/2023    CHLORIDE 100 11/17/2023    CO2 27 11/17/2023    ANIONGAP 10 11/17/2023    GLC 99 11/17/2023    BUN 14.9 11/17/2023    CR 0.77 11/17/2023    GFRESTIMATED >90  "11/17/2023    AMPARO 9.1 11/17/2023       Lab Results   Component Value Date    MICROL <12.0 11/17/2023    MICROL 9 09/02/2022    MICROL 30 12/16/2020     No results found for: \"MICROALBUMIN\"  Blood pressure is at goal, reviewed normal kidney functions, potassium and urine microalbumin  Continue with current medications, refills given today, follow-up for recheck in 1 year or sooner if needed  Will follow low salt diet, weight loss and regular exercises.      (R06.83) Snoring  Comment:   Plan: Patient was referred to sleep study in 2018 he reports continuing snoring but not getting any worse  He is not interested in sleep study at this time   emphasized on weight loss, consider sleep study for worsening symptoms to rule out sleep apnea  Patient verbalised understanding and is agreeable to the plan.                COUNSELING:   Reviewed preventive health counseling, as reflected in patient instructions  Special attention given to:        Regular exercise       Healthy diet/nutrition       Vision screening       Immunizations  Vaccinated for: Hepatitis B        BMI:   Estimated body mass index is 29.64 kg/m  as calculated from the following:    Height as of this encounter: 1.753 m (5' 9\").    Weight as of this encounter: 91 kg (200 lb 11.2 oz).   Weight management plan: Discussed healthy diet and exercise guidelines      He reports that he quit smoking about 18 years ago. His smoking use included cigarettes and cigars. He has never been exposed to tobacco smoke. He has never used smokeless tobacco.      Chart documentation done in part with Dragon Voice recognition Software. Although reviewed after completion, some word and grammatical error may remain.      Jose Holden MD  [facility]"

## 2024-12-26 ENCOUNTER — TELEPHONE (OUTPATIENT)
Dept: FAMILY MEDICINE | Facility: CLINIC | Age: 44
End: 2024-12-26
Payer: COMMERCIAL

## 2024-12-26 NOTE — TELEPHONE ENCOUNTER
Writer attempted to call pt to reschedule tammyer 3/11/2025 appointment and was unable to leave a message.  RAMANDEEP AVILES

## 2025-01-15 ENCOUNTER — LAB (OUTPATIENT)
Dept: LAB | Facility: CLINIC | Age: 45
End: 2025-01-15
Payer: COMMERCIAL

## 2025-01-15 DIAGNOSIS — Z13.1 SCREENING FOR DIABETES MELLITUS (DM): ICD-10-CM

## 2025-01-15 DIAGNOSIS — Z13.0 SCREENING FOR DEFICIENCY ANEMIA: ICD-10-CM

## 2025-01-15 DIAGNOSIS — I10 HYPERTENSION GOAL BP (BLOOD PRESSURE) < 140/90: ICD-10-CM

## 2025-01-15 DIAGNOSIS — Z13.220 SCREENING FOR HYPERLIPIDEMIA: ICD-10-CM

## 2025-01-15 DIAGNOSIS — R73.01 ELEVATED FASTING GLUCOSE: Primary | ICD-10-CM

## 2025-01-15 LAB
ALBUMIN SERPL BCG-MCNC: 4.3 G/DL (ref 3.5–5.2)
ALP SERPL-CCNC: 104 U/L (ref 40–150)
ALT SERPL W P-5'-P-CCNC: 40 U/L (ref 0–70)
ANION GAP SERPL CALCULATED.3IONS-SCNC: 10 MMOL/L (ref 7–15)
AST SERPL W P-5'-P-CCNC: 23 U/L (ref 0–45)
BILIRUB SERPL-MCNC: 0.6 MG/DL
BUN SERPL-MCNC: 17.6 MG/DL (ref 6–20)
CALCIUM SERPL-MCNC: 9.5 MG/DL (ref 8.8–10.4)
CHLORIDE SERPL-SCNC: 100 MMOL/L (ref 98–107)
CHOLEST SERPL-MCNC: 160 MG/DL
CREAT SERPL-MCNC: 0.82 MG/DL (ref 0.67–1.17)
CREAT UR-MCNC: 119 MG/DL
EGFRCR SERPLBLD CKD-EPI 2021: >90 ML/MIN/1.73M2
ERYTHROCYTE [DISTWIDTH] IN BLOOD BY AUTOMATED COUNT: 13 % (ref 10–15)
EST. AVERAGE GLUCOSE BLD GHB EST-MCNC: 105 MG/DL
FASTING STATUS PATIENT QL REPORTED: YES
FASTING STATUS PATIENT QL REPORTED: YES
GLUCOSE SERPL-MCNC: 103 MG/DL (ref 70–99)
HBA1C MFR BLD: 5.3 % (ref 0–5.6)
HCO3 SERPL-SCNC: 28 MMOL/L (ref 22–29)
HCT VFR BLD AUTO: 47.3 % (ref 40–53)
HDLC SERPL-MCNC: 36 MG/DL
HGB BLD-MCNC: 16.1 G/DL (ref 13.3–17.7)
LDLC SERPL CALC-MCNC: 99 MG/DL
MCH RBC QN AUTO: 29.7 PG (ref 26.5–33)
MCHC RBC AUTO-ENTMCNC: 34 G/DL (ref 31.5–36.5)
MCV RBC AUTO: 87 FL (ref 78–100)
MICROALBUMIN UR-MCNC: <12 MG/L
MICROALBUMIN/CREAT UR: NORMAL MG/G{CREAT}
NONHDLC SERPL-MCNC: 124 MG/DL
PLATELET # BLD AUTO: 356 10E3/UL (ref 150–450)
POTASSIUM SERPL-SCNC: 4.1 MMOL/L (ref 3.4–5.3)
PROT SERPL-MCNC: 7.4 G/DL (ref 6.4–8.3)
RBC # BLD AUTO: 5.43 10E6/UL (ref 4.4–5.9)
SODIUM SERPL-SCNC: 138 MMOL/L (ref 135–145)
TRIGL SERPL-MCNC: 127 MG/DL
WBC # BLD AUTO: 9.5 10E3/UL (ref 4–11)

## 2025-01-15 PROCEDURE — 36415 COLL VENOUS BLD VENIPUNCTURE: CPT

## 2025-01-15 PROCEDURE — 83036 HEMOGLOBIN GLYCOSYLATED A1C: CPT

## 2025-01-15 PROCEDURE — 85027 COMPLETE CBC AUTOMATED: CPT

## 2025-01-15 PROCEDURE — 80053 COMPREHEN METABOLIC PANEL: CPT

## 2025-01-15 PROCEDURE — 80061 LIPID PANEL: CPT

## 2025-01-15 PROCEDURE — 82043 UR ALBUMIN QUANTITATIVE: CPT

## 2025-01-15 PROCEDURE — 82570 ASSAY OF URINE CREATININE: CPT
